# Patient Record
Sex: FEMALE | Race: WHITE | NOT HISPANIC OR LATINO | Employment: UNEMPLOYED | URBAN - METROPOLITAN AREA
[De-identification: names, ages, dates, MRNs, and addresses within clinical notes are randomized per-mention and may not be internally consistent; named-entity substitution may affect disease eponyms.]

---

## 2017-04-18 ENCOUNTER — HOSPITAL ENCOUNTER (OUTPATIENT)
Dept: OTHER | Facility: HOSPITAL | Age: 57
Setting detail: SPECIMEN
Discharge: HOME | End: 2017-04-18
Attending: NURSE PRACTITIONER | Admitting: NURSE PRACTITIONER

## 2017-04-18 LAB
ALBUMIN SERPL-MCNC: 3.4 G/DL (ref 3.5–4.8)
ALBUMIN/GLOB SERPL: 1.1 {RATIO} (ref 1–1.7)
ALP SERPL-CCNC: 57 IU/L (ref 32–91)
ALT SERPL-CCNC: 13 IU/L (ref 14–54)
ANION GAP SERPL CALC-SCNC: 11.8 MMOL/L (ref 10–20)
AST SERPL-CCNC: 18 IU/L (ref 15–41)
BASOPHILS # BLD AUTO: 0.1 10*3/UL (ref 0–0.2)
BASOPHILS NFR BLD AUTO: 1 % (ref 0–2)
BILIRUB SERPL-MCNC: 0.3 MG/DL (ref 0.3–1.2)
BUN SERPL-MCNC: 13 MG/DL (ref 8–20)
BUN/CREAT SERPL: 21.7 (ref 5.4–26.2)
CALCIUM SERPL-MCNC: 9.1 MG/DL (ref 8.9–10.3)
CHLORIDE SERPL-SCNC: 103 MMOL/L (ref 101–111)
CHOLEST SERPL-MCNC: 205 MG/DL
CHOLEST/HDLC SERPL: 4.2 {RATIO}
CONV CO2: 28 MMOL/L (ref 22–32)
CONV LDL CHOLESTEROL DIRECT: 139 MG/DL (ref 0–100)
CONV TOTAL PROTEIN: 6.6 G/DL (ref 6.1–7.9)
CREAT UR-MCNC: 0.6 MG/DL (ref 0.4–1)
DIFFERENTIAL METHOD BLD: (no result)
EOSINOPHIL # BLD AUTO: 0.1 10*3/UL (ref 0–0.3)
EOSINOPHIL # BLD AUTO: 2 % (ref 0–3)
ERYTHROCYTE [DISTWIDTH] IN BLOOD BY AUTOMATED COUNT: 13 % (ref 11.5–14.5)
GLOBULIN UR ELPH-MCNC: 3.2 G/DL (ref 2.5–3.8)
GLUCOSE SERPL-MCNC: 88 MG/DL (ref 65–99)
HCT VFR BLD AUTO: 37.9 % (ref 35–49)
HDLC SERPL-MCNC: 49 MG/DL
HGB BLD-MCNC: 12.9 G/DL (ref 12–15)
LDLC/HDLC SERPL: 2.8 {RATIO}
LIPID INTERPRETATION: ABNORMAL
LYMPHOCYTES # BLD AUTO: 2.1 10*3/UL (ref 0.8–4.8)
LYMPHOCYTES NFR BLD AUTO: 32 % (ref 18–42)
MCH RBC QN AUTO: 30.4 PG (ref 26–32)
MCHC RBC AUTO-ENTMCNC: 34 G/DL (ref 32–36)
MCV RBC AUTO: 89.3 FL (ref 80–94)
MONOCYTES # BLD AUTO: 0.4 10*3/UL (ref 0.1–1.3)
MONOCYTES NFR BLD AUTO: 6 % (ref 2–11)
NEUTROPHILS # BLD AUTO: 3.9 10*3/UL (ref 2.3–8.6)
NEUTROPHILS NFR BLD AUTO: 59 % (ref 50–75)
NRBC BLD AUTO-RTO: 0 /100{WBCS}
NRBC/RBC NFR BLD MANUAL: 0 10*3/UL
PLATELET # BLD AUTO: 303 10*3/UL (ref 150–450)
PMV BLD AUTO: 8.5 FL (ref 7.4–10.4)
POTASSIUM SERPL-SCNC: 3.8 MMOL/L (ref 3.6–5.1)
RBC # BLD AUTO: 4.24 10*6/UL (ref 4–5.4)
SODIUM SERPL-SCNC: 139 MMOL/L (ref 136–144)
TRIGL SERPL-MCNC: 136 MG/DL
VLDLC SERPL CALC-MCNC: 17.7 MG/DL
WBC # BLD AUTO: 6.6 10*3/UL (ref 4.5–11.5)

## 2019-07-29 RX ORDER — BUTALBITAL, ACETAMINOPHEN AND CAFFEINE 50; 325; 40 MG/1; MG/1; MG/1
TABLET ORAL
Qty: 15 TABLET | Refills: 1 | OUTPATIENT
Start: 2019-07-29 | End: 2019-07-30 | Stop reason: SDUPTHER

## 2019-07-31 RX ORDER — BUTALBITAL, ACETAMINOPHEN AND CAFFEINE 50; 325; 40 MG/1; MG/1; MG/1
TABLET ORAL
Qty: 15 TABLET | Refills: 2 | Status: SHIPPED | OUTPATIENT
Start: 2019-07-31 | End: 2019-12-04

## 2019-08-24 ENCOUNTER — HOSPITAL ENCOUNTER (EMERGENCY)
Facility: HOSPITAL | Age: 59
Discharge: HOME | End: 2019-08-24
Attending: EMERGENCY MEDICINE | Admitting: EMERGENCY MEDICINE

## 2019-08-24 ENCOUNTER — APPOINTMENT (OUTPATIENT)
Dept: GENERAL RADIOLOGY | Facility: HOSPITAL | Age: 59
End: 2019-08-24

## 2019-08-24 VITALS
TEMPERATURE: 98.5 F | SYSTOLIC BLOOD PRESSURE: 120 MMHG | BODY MASS INDEX: 23.71 KG/M2 | HEART RATE: 86 BPM | RESPIRATION RATE: 16 BRPM | DIASTOLIC BLOOD PRESSURE: 73 MMHG | WEIGHT: 138.89 LBS | OXYGEN SATURATION: 93 % | HEIGHT: 64 IN

## 2019-08-24 DIAGNOSIS — M25.511 ACUTE PAIN OF RIGHT SHOULDER: Primary | ICD-10-CM

## 2019-08-24 PROCEDURE — 63710000001 PREDNISONE PER 5 MG: Performed by: NURSE PRACTITIONER

## 2019-08-24 PROCEDURE — 73030 X-RAY EXAM OF SHOULDER: CPT

## 2019-08-24 PROCEDURE — 99283 EMERGENCY DEPT VISIT LOW MDM: CPT

## 2019-08-24 RX ORDER — HYDROCODONE BITARTRATE AND ACETAMINOPHEN 5; 325 MG/1; MG/1
1 TABLET ORAL EVERY 4 HOURS PRN
Qty: 15 TABLET | Refills: 0 | Status: SHIPPED | OUTPATIENT
Start: 2019-08-24 | End: 2019-12-04

## 2019-08-24 RX ORDER — PREDNISONE 20 MG/1
20 TABLET ORAL 2 TIMES DAILY
Qty: 10 TABLET | Refills: 0 | Status: SHIPPED | OUTPATIENT
Start: 2019-08-24 | End: 2019-12-04

## 2019-08-24 RX ORDER — HYDROCODONE BITARTRATE AND ACETAMINOPHEN 7.5; 325 MG/1; MG/1
1 TABLET ORAL ONCE
Status: COMPLETED | OUTPATIENT
Start: 2019-08-24 | End: 2019-08-24

## 2019-08-24 RX ADMIN — HYDROCODONE BITARTRATE AND ACETAMINOPHEN 1 TABLET: 7.5; 325 TABLET ORAL at 02:28

## 2019-08-24 RX ADMIN — PREDNISONE 60 MG: 10 TABLET ORAL at 02:28

## 2019-12-04 ENCOUNTER — APPOINTMENT (OUTPATIENT)
Dept: GENERAL RADIOLOGY | Facility: HOSPITAL | Age: 59
End: 2019-12-04

## 2019-12-04 ENCOUNTER — HOSPITAL ENCOUNTER (INPATIENT)
Facility: HOSPITAL | Age: 59
LOS: 4 days | Discharge: NURSING, REHAB OR LONG-TERM CARE | End: 2019-12-09
Attending: EMERGENCY MEDICINE | Admitting: INTERNAL MEDICINE

## 2019-12-04 DIAGNOSIS — J44.1 ACUTE EXACERBATION OF CHRONIC OBSTRUCTIVE PULMONARY DISEASE (COPD) (HCC): ICD-10-CM

## 2019-12-04 DIAGNOSIS — F11.11 HISTORY OF HEROIN ABUSE (HCC): ICD-10-CM

## 2019-12-04 DIAGNOSIS — J18.9 COMMUNITY ACQUIRED PNEUMONIA, BILATERAL: ICD-10-CM

## 2019-12-04 DIAGNOSIS — Z87.09 HISTORY OF PNEUMOTHORAX: ICD-10-CM

## 2019-12-04 DIAGNOSIS — A41.9 SEVERE SEPSIS (HCC): ICD-10-CM

## 2019-12-04 DIAGNOSIS — R65.20 SEVERE SEPSIS (HCC): ICD-10-CM

## 2019-12-04 DIAGNOSIS — F11.20 HEROIN ADDICTION (HCC): Chronic | ICD-10-CM

## 2019-12-04 DIAGNOSIS — J18.9 PNEUMONIA OF RIGHT MIDDLE LOBE DUE TO INFECTIOUS ORGANISM: Primary | ICD-10-CM

## 2019-12-04 PROBLEM — I21.3 ST ELEVATION (STEMI) MYOCARDIAL INFARCTION (HCC): Status: ACTIVE | Noted: 2019-12-04

## 2019-12-04 PROBLEM — G43.909 HEADACHE, MIGRAINE: Status: ACTIVE | Noted: 2019-12-04

## 2019-12-04 PROBLEM — I21.3 ST ELEVATION (STEMI) MYOCARDIAL INFARCTION (HCC): Status: RESOLVED | Noted: 2019-12-04 | Resolved: 2019-12-04

## 2019-12-04 LAB
ALBUMIN SERPL-MCNC: 3.8 G/DL (ref 3.5–5.2)
ALBUMIN/GLOB SERPL: 1 G/DL
ALP SERPL-CCNC: 92 U/L (ref 39–117)
ALT SERPL W P-5'-P-CCNC: 18 U/L (ref 1–33)
AMPHET+METHAMPHET UR QL: NEGATIVE
ANION GAP SERPL CALCULATED.3IONS-SCNC: 10 MMOL/L (ref 5–15)
AST SERPL-CCNC: 19 U/L (ref 1–32)
BARBITURATES UR QL SCN: NEGATIVE
BASOPHILS # BLD AUTO: 0.1 10*3/MM3 (ref 0–0.2)
BASOPHILS NFR BLD AUTO: 0.4 % (ref 0–1.5)
BENZODIAZ UR QL SCN: NEGATIVE
BILIRUB SERPL-MCNC: 0.2 MG/DL (ref 0.2–1.2)
BUN BLD-MCNC: 15 MG/DL (ref 6–20)
BUN/CREAT SERPL: 18.8 (ref 7–25)
CALCIUM SPEC-SCNC: 9 MG/DL (ref 8.6–10.5)
CANNABINOIDS SERPL QL: NEGATIVE
CHLORIDE SERPL-SCNC: 93 MMOL/L (ref 98–107)
CO2 SERPL-SCNC: 31 MMOL/L (ref 22–29)
COCAINE UR QL: NEGATIVE
CREAT BLD-MCNC: 0.8 MG/DL (ref 0.57–1)
D-LACTATE SERPL-SCNC: 0.8 MMOL/L (ref 0.5–2)
DEPRECATED RDW RBC AUTO: 44.6 FL (ref 37–54)
EOSINOPHIL # BLD AUTO: 0.4 10*3/MM3 (ref 0–0.4)
EOSINOPHIL NFR BLD AUTO: 2.5 % (ref 0.3–6.2)
ERYTHROCYTE [DISTWIDTH] IN BLOOD BY AUTOMATED COUNT: 14.3 % (ref 12.3–15.4)
GFR SERPL CREATININE-BSD FRML MDRD: 74 ML/MIN/1.73
GLOBULIN UR ELPH-MCNC: 3.8 GM/DL
GLUCOSE BLD-MCNC: 111 MG/DL (ref 65–99)
HCT VFR BLD AUTO: 36 % (ref 34–46.6)
HGB BLD-MCNC: 11.9 G/DL (ref 12–15.9)
HOLD SPECIMEN: NORMAL
HOLD SPECIMEN: NORMAL
LYMPHOCYTES # BLD AUTO: 1.7 10*3/MM3 (ref 0.7–3.1)
LYMPHOCYTES NFR BLD AUTO: 10.4 % (ref 19.6–45.3)
MCH RBC QN AUTO: 29.3 PG (ref 26.6–33)
MCHC RBC AUTO-ENTMCNC: 33 G/DL (ref 31.5–35.7)
MCV RBC AUTO: 88.6 FL (ref 79–97)
METHADONE UR QL SCN: NEGATIVE
MONOCYTES # BLD AUTO: 1.4 10*3/MM3 (ref 0.1–0.9)
MONOCYTES NFR BLD AUTO: 8.5 % (ref 5–12)
NEUTROPHILS # BLD AUTO: 12.5 10*3/MM3 (ref 1.7–7)
NEUTROPHILS NFR BLD AUTO: 78.2 % (ref 42.7–76)
NRBC BLD AUTO-RTO: 0 /100 WBC (ref 0–0.2)
OPIATES UR QL: NEGATIVE
OXYCODONE UR QL SCN: NEGATIVE
PLATELET # BLD AUTO: 298 10*3/MM3 (ref 140–450)
PMV BLD AUTO: 8 FL (ref 6–12)
POTASSIUM BLD-SCNC: 4.5 MMOL/L (ref 3.5–5.2)
PROT SERPL-MCNC: 7.6 G/DL (ref 6–8.5)
RBC # BLD AUTO: 4.06 10*6/MM3 (ref 3.77–5.28)
SODIUM BLD-SCNC: 134 MMOL/L (ref 136–145)
TROPONIN T SERPL-MCNC: <0.01 NG/ML (ref 0–0.03)
WBC NRBC COR # BLD: 16 10*3/MM3 (ref 3.4–10.8)
WHOLE BLOOD HOLD SPECIMEN: NORMAL
WHOLE BLOOD HOLD SPECIMEN: NORMAL

## 2019-12-04 PROCEDURE — 80307 DRUG TEST PRSMV CHEM ANLYZR: CPT | Performed by: EMERGENCY MEDICINE

## 2019-12-04 PROCEDURE — G0378 HOSPITAL OBSERVATION PER HR: HCPCS

## 2019-12-04 PROCEDURE — 84484 ASSAY OF TROPONIN QUANT: CPT | Performed by: EMERGENCY MEDICINE

## 2019-12-04 PROCEDURE — 99223 1ST HOSP IP/OBS HIGH 75: CPT | Performed by: INTERNAL MEDICINE

## 2019-12-04 PROCEDURE — 25010000002 METHYLPREDNISOLONE PER 125 MG: Performed by: EMERGENCY MEDICINE

## 2019-12-04 PROCEDURE — 25010000003 AMPICILLIN-SULBACTAM PER 1.5 G: Performed by: EMERGENCY MEDICINE

## 2019-12-04 PROCEDURE — 71045 X-RAY EXAM CHEST 1 VIEW: CPT

## 2019-12-04 PROCEDURE — 25010000002 VANCOMYCIN 10 G RECONSTITUTED SOLUTION: Performed by: EMERGENCY MEDICINE

## 2019-12-04 PROCEDURE — 83605 ASSAY OF LACTIC ACID: CPT

## 2019-12-04 PROCEDURE — 94640 AIRWAY INHALATION TREATMENT: CPT

## 2019-12-04 PROCEDURE — 85025 COMPLETE CBC W/AUTO DIFF WBC: CPT | Performed by: EMERGENCY MEDICINE

## 2019-12-04 PROCEDURE — 99285 EMERGENCY DEPT VISIT HI MDM: CPT

## 2019-12-04 PROCEDURE — 80053 COMPREHEN METABOLIC PANEL: CPT | Performed by: EMERGENCY MEDICINE

## 2019-12-04 PROCEDURE — 93005 ELECTROCARDIOGRAM TRACING: CPT | Performed by: EMERGENCY MEDICINE

## 2019-12-04 PROCEDURE — 87040 BLOOD CULTURE FOR BACTERIA: CPT | Performed by: EMERGENCY MEDICINE

## 2019-12-04 RX ORDER — IPRATROPIUM BROMIDE AND ALBUTEROL SULFATE 2.5; .5 MG/3ML; MG/3ML
3 SOLUTION RESPIRATORY (INHALATION) ONCE
Status: COMPLETED | OUTPATIENT
Start: 2019-12-04 | End: 2019-12-04

## 2019-12-04 RX ORDER — SODIUM CHLORIDE 0.9 % (FLUSH) 0.9 %
10 SYRINGE (ML) INJECTION AS NEEDED
Status: DISCONTINUED | OUTPATIENT
Start: 2019-12-04 | End: 2019-12-09 | Stop reason: HOSPADM

## 2019-12-04 RX ORDER — IPRATROPIUM BROMIDE AND ALBUTEROL SULFATE 2.5; .5 MG/3ML; MG/3ML
3 SOLUTION RESPIRATORY (INHALATION) ONCE
Status: DISCONTINUED | OUTPATIENT
Start: 2019-12-04 | End: 2019-12-04

## 2019-12-04 RX ORDER — ACETAMINOPHEN 500 MG
1000 TABLET ORAL ONCE
Status: COMPLETED | OUTPATIENT
Start: 2019-12-04 | End: 2019-12-04

## 2019-12-04 RX ORDER — METHYLPREDNISOLONE SODIUM SUCCINATE 125 MG/2ML
125 INJECTION, POWDER, LYOPHILIZED, FOR SOLUTION INTRAMUSCULAR; INTRAVENOUS ONCE
Status: COMPLETED | OUTPATIENT
Start: 2019-12-04 | End: 2019-12-04

## 2019-12-04 RX ORDER — BUTALBITAL, ACETAMINOPHEN AND CAFFEINE 50; 325; 40 MG/1; MG/1; MG/1
1 TABLET ORAL EVERY 6 HOURS PRN
Status: ON HOLD | COMMUNITY
End: 2019-12-09 | Stop reason: SDUPTHER

## 2019-12-04 RX ORDER — ALBUTEROL SULFATE 90 UG/1
2 AEROSOL, METERED RESPIRATORY (INHALATION) EVERY 4 HOURS PRN
COMMUNITY

## 2019-12-04 RX ORDER — BUPRENORPHINE HYDROCHLORIDE AND NALOXONE HYDROCHLORIDE DIHYDRATE 8; 2 MG/1; MG/1
1 TABLET SUBLINGUAL 2 TIMES DAILY
COMMUNITY

## 2019-12-04 RX ADMIN — METHYLPREDNISOLONE SODIUM SUCCINATE 125 MG: 125 INJECTION, POWDER, FOR SOLUTION INTRAMUSCULAR; INTRAVENOUS at 21:57

## 2019-12-04 RX ADMIN — ACETAMINOPHEN 1000 MG: 500 TABLET, FILM COATED ORAL at 20:32

## 2019-12-04 RX ADMIN — IPRATROPIUM BROMIDE AND ALBUTEROL SULFATE 3 ML: .5; 3 SOLUTION RESPIRATORY (INHALATION) at 21:34

## 2019-12-04 RX ADMIN — AMPICILLIN SODIUM AND SULBACTAM SODIUM 3 G: 2; 1 INJECTION, POWDER, FOR SOLUTION INTRAMUSCULAR; INTRAVENOUS at 23:57

## 2019-12-04 RX ADMIN — VANCOMYCIN HYDROCHLORIDE 1250 MG: 10 INJECTION, POWDER, LYOPHILIZED, FOR SOLUTION INTRAVENOUS at 21:59

## 2019-12-05 PROBLEM — F11.20 HEROIN ADDICTION (HCC): Chronic | Status: ACTIVE | Noted: 2019-12-05

## 2019-12-05 PROBLEM — J96.01 ACUTE RESPIRATORY FAILURE WITH HYPOXIA (HCC): Status: ACTIVE | Noted: 2019-12-05

## 2019-12-05 PROBLEM — A41.9 SEVERE SEPSIS (HCC): Status: ACTIVE | Noted: 2019-12-04

## 2019-12-05 PROBLEM — J44.1 COPD EXACERBATION (HCC): Status: ACTIVE | Noted: 2019-12-05

## 2019-12-05 PROBLEM — R65.20 SEVERE SEPSIS (HCC): Status: ACTIVE | Noted: 2019-12-04

## 2019-12-05 LAB
ANION GAP SERPL CALCULATED.3IONS-SCNC: 8 MMOL/L (ref 5–15)
BASOPHILS # BLD AUTO: 0 10*3/MM3 (ref 0–0.2)
BASOPHILS NFR BLD AUTO: 0.1 % (ref 0–1.5)
BUN BLD-MCNC: 16 MG/DL (ref 6–20)
BUN/CREAT SERPL: 21.6 (ref 7–25)
CALCIUM SPEC-SCNC: 9.1 MG/DL (ref 8.6–10.5)
CHLORIDE SERPL-SCNC: 100 MMOL/L (ref 98–107)
CO2 SERPL-SCNC: 31 MMOL/L (ref 22–29)
CREAT BLD-MCNC: 0.74 MG/DL (ref 0.57–1)
DEPRECATED RDW RBC AUTO: 46.8 FL (ref 37–54)
EOSINOPHIL # BLD AUTO: 0 10*3/MM3 (ref 0–0.4)
EOSINOPHIL NFR BLD AUTO: 0 % (ref 0.3–6.2)
ERYTHROCYTE [DISTWIDTH] IN BLOOD BY AUTOMATED COUNT: 14.6 % (ref 12.3–15.4)
GFR SERPL CREATININE-BSD FRML MDRD: 81 ML/MIN/1.73
GLUCOSE BLD-MCNC: 201 MG/DL (ref 65–99)
HBA1C MFR BLD: 5.5 % (ref 3.5–5.6)
HCT VFR BLD AUTO: 35.5 % (ref 34–46.6)
HGB BLD-MCNC: 11.6 G/DL (ref 12–15.9)
HIV1+2 AB SER QL: NORMAL
L PNEUMO1 AG UR QL IA: NEGATIVE
LYMPHOCYTES # BLD AUTO: 0.6 10*3/MM3 (ref 0.7–3.1)
LYMPHOCYTES NFR BLD AUTO: 4.2 % (ref 19.6–45.3)
MCH RBC QN AUTO: 29.1 PG (ref 26.6–33)
MCHC RBC AUTO-ENTMCNC: 32.5 G/DL (ref 31.5–35.7)
MCV RBC AUTO: 89.4 FL (ref 79–97)
MONOCYTES # BLD AUTO: 0.4 10*3/MM3 (ref 0.1–0.9)
MONOCYTES NFR BLD AUTO: 2.9 % (ref 5–12)
NEUTROPHILS # BLD AUTO: 13.5 10*3/MM3 (ref 1.7–7)
NEUTROPHILS NFR BLD AUTO: 92.8 % (ref 42.7–76)
NRBC BLD AUTO-RTO: 0 /100 WBC (ref 0–0.2)
PLATELET # BLD AUTO: 295 10*3/MM3 (ref 140–450)
PMV BLD AUTO: 8.4 FL (ref 6–12)
POTASSIUM BLD-SCNC: 5.1 MMOL/L (ref 3.5–5.2)
RBC # BLD AUTO: 3.98 10*6/MM3 (ref 3.77–5.28)
S PNEUM AG SPEC QL LA: NEGATIVE
SODIUM BLD-SCNC: 139 MMOL/L (ref 136–145)
WBC NRBC COR # BLD: 14.6 10*3/MM3 (ref 3.4–10.8)

## 2019-12-05 PROCEDURE — 80048 BASIC METABOLIC PNL TOTAL CA: CPT | Performed by: NURSE PRACTITIONER

## 2019-12-05 PROCEDURE — 25010000003 AMPICILLIN-SULBACTAM PER 1.5 G: Performed by: EMERGENCY MEDICINE

## 2019-12-05 PROCEDURE — 87340 HEPATITIS B SURFACE AG IA: CPT | Performed by: INTERNAL MEDICINE

## 2019-12-05 PROCEDURE — G0432 EIA HIV-1/HIV-2 SCREEN: HCPCS | Performed by: INTERNAL MEDICINE

## 2019-12-05 PROCEDURE — 83036 HEMOGLOBIN GLYCOSYLATED A1C: CPT | Performed by: INTERNAL MEDICINE

## 2019-12-05 PROCEDURE — 94760 N-INVAS EAR/PLS OXIMETRY 1: CPT

## 2019-12-05 PROCEDURE — 25010000002 VANCOMYCIN 1 G RECONSTITUTED SOLUTION 1 EACH VIAL: Performed by: EMERGENCY MEDICINE

## 2019-12-05 PROCEDURE — 25010000002 METHYLPREDNISOLONE PER 40 MG: Performed by: NURSE PRACTITIONER

## 2019-12-05 PROCEDURE — 25010000002 METHYLPREDNISOLONE PER 40 MG: Performed by: INTERNAL MEDICINE

## 2019-12-05 PROCEDURE — 87899 AGENT NOS ASSAY W/OPTIC: CPT | Performed by: INTERNAL MEDICINE

## 2019-12-05 PROCEDURE — 99233 SBSQ HOSP IP/OBS HIGH 50: CPT | Performed by: INTERNAL MEDICINE

## 2019-12-05 PROCEDURE — 86709 HEPATITIS A IGM ANTIBODY: CPT | Performed by: INTERNAL MEDICINE

## 2019-12-05 PROCEDURE — 86705 HEP B CORE ANTIBODY IGM: CPT | Performed by: INTERNAL MEDICINE

## 2019-12-05 PROCEDURE — 85025 COMPLETE CBC W/AUTO DIFF WBC: CPT | Performed by: NURSE PRACTITIONER

## 2019-12-05 PROCEDURE — 94799 UNLISTED PULMONARY SVC/PX: CPT

## 2019-12-05 RX ORDER — BISACODYL 5 MG/1
5 TABLET, DELAYED RELEASE ORAL DAILY PRN
Status: DISCONTINUED | OUTPATIENT
Start: 2019-12-05 | End: 2019-12-07

## 2019-12-05 RX ORDER — SODIUM CHLORIDE 0.9 % (FLUSH) 0.9 %
10 SYRINGE (ML) INJECTION AS NEEDED
Status: DISCONTINUED | OUTPATIENT
Start: 2019-12-05 | End: 2019-12-09 | Stop reason: HOSPADM

## 2019-12-05 RX ORDER — AZITHROMYCIN 250 MG/1
250 TABLET, FILM COATED ORAL DAILY
Status: DISCONTINUED | OUTPATIENT
Start: 2019-12-06 | End: 2019-12-05

## 2019-12-05 RX ORDER — IPRATROPIUM BROMIDE AND ALBUTEROL SULFATE 2.5; .5 MG/3ML; MG/3ML
3 SOLUTION RESPIRATORY (INHALATION) EVERY 4 HOURS PRN
Status: DISCONTINUED | OUTPATIENT
Start: 2019-12-05 | End: 2019-12-05

## 2019-12-05 RX ORDER — SODIUM CHLORIDE 9 MG/ML
100 INJECTION, SOLUTION INTRAVENOUS CONTINUOUS
Status: DISCONTINUED | OUTPATIENT
Start: 2019-12-05 | End: 2019-12-05

## 2019-12-05 RX ORDER — ACETAMINOPHEN 650 MG/1
650 SUPPOSITORY RECTAL EVERY 4 HOURS PRN
Status: DISCONTINUED | OUTPATIENT
Start: 2019-12-05 | End: 2019-12-09 | Stop reason: HOSPADM

## 2019-12-05 RX ORDER — ONDANSETRON 2 MG/ML
4 INJECTION INTRAMUSCULAR; INTRAVENOUS EVERY 6 HOURS PRN
Status: DISCONTINUED | OUTPATIENT
Start: 2019-12-05 | End: 2019-12-09 | Stop reason: HOSPADM

## 2019-12-05 RX ORDER — BUPRENORPHINE HYDROCHLORIDE AND NALOXONE HYDROCHLORIDE DIHYDRATE 8; 2 MG/1; MG/1
1 TABLET SUBLINGUAL 2 TIMES DAILY
Status: DISCONTINUED | OUTPATIENT
Start: 2019-12-05 | End: 2019-12-09 | Stop reason: HOSPADM

## 2019-12-05 RX ORDER — AZITHROMYCIN 250 MG/1
500 TABLET, FILM COATED ORAL DAILY
Status: COMPLETED | OUTPATIENT
Start: 2019-12-05 | End: 2019-12-05

## 2019-12-05 RX ORDER — SODIUM CHLORIDE 0.9 % (FLUSH) 0.9 %
10 SYRINGE (ML) INJECTION EVERY 12 HOURS SCHEDULED
Status: DISCONTINUED | OUTPATIENT
Start: 2019-12-05 | End: 2019-12-09 | Stop reason: HOSPADM

## 2019-12-05 RX ORDER — BUTALBITAL, ACETAMINOPHEN AND CAFFEINE 50; 325; 40 MG/1; MG/1; MG/1
1 TABLET ORAL EVERY 6 HOURS PRN
Status: DISCONTINUED | OUTPATIENT
Start: 2019-12-05 | End: 2019-12-07

## 2019-12-05 RX ORDER — IBUPROFEN 400 MG/1
400 TABLET ORAL EVERY 6 HOURS PRN
Status: DISCONTINUED | OUTPATIENT
Start: 2019-12-05 | End: 2019-12-09 | Stop reason: HOSPADM

## 2019-12-05 RX ORDER — IPRATROPIUM BROMIDE AND ALBUTEROL SULFATE 2.5; .5 MG/3ML; MG/3ML
3 SOLUTION RESPIRATORY (INHALATION)
Status: DISCONTINUED | OUTPATIENT
Start: 2019-12-05 | End: 2019-12-09 | Stop reason: HOSPADM

## 2019-12-05 RX ORDER — ALUMINA, MAGNESIA, AND SIMETHICONE 2400; 2400; 240 MG/30ML; MG/30ML; MG/30ML
15 SUSPENSION ORAL EVERY 6 HOURS PRN
Status: DISCONTINUED | OUTPATIENT
Start: 2019-12-05 | End: 2019-12-09 | Stop reason: HOSPADM

## 2019-12-05 RX ORDER — ACETAMINOPHEN 325 MG/1
650 TABLET ORAL EVERY 4 HOURS PRN
Status: DISCONTINUED | OUTPATIENT
Start: 2019-12-05 | End: 2019-12-09 | Stop reason: HOSPADM

## 2019-12-05 RX ORDER — METHYLPREDNISOLONE SODIUM SUCCINATE 40 MG/ML
40 INJECTION, POWDER, LYOPHILIZED, FOR SOLUTION INTRAMUSCULAR; INTRAVENOUS EVERY 12 HOURS
Status: DISCONTINUED | OUTPATIENT
Start: 2019-12-05 | End: 2019-12-06

## 2019-12-05 RX ORDER — ACETAMINOPHEN 160 MG/5ML
650 SOLUTION ORAL EVERY 4 HOURS PRN
Status: DISCONTINUED | OUTPATIENT
Start: 2019-12-05 | End: 2019-12-09 | Stop reason: HOSPADM

## 2019-12-05 RX ORDER — NICOTINE 21 MG/24HR
1 PATCH, TRANSDERMAL 24 HOURS TRANSDERMAL DAILY
Status: DISCONTINUED | OUTPATIENT
Start: 2019-12-05 | End: 2019-12-09 | Stop reason: HOSPADM

## 2019-12-05 RX ORDER — CHOLECALCIFEROL (VITAMIN D3) 125 MCG
5 CAPSULE ORAL NIGHTLY PRN
Status: DISCONTINUED | OUTPATIENT
Start: 2019-12-05 | End: 2019-12-09 | Stop reason: HOSPADM

## 2019-12-05 RX ORDER — BUPRENORPHINE HYDROCHLORIDE AND NALOXONE HYDROCHLORIDE DIHYDRATE 8; 2 MG/1; MG/1
1 TABLET SUBLINGUAL 2 TIMES DAILY
Status: DISCONTINUED | OUTPATIENT
Start: 2019-12-05 | End: 2019-12-05

## 2019-12-05 RX ORDER — METHYLPREDNISOLONE SODIUM SUCCINATE 40 MG/ML
40 INJECTION, POWDER, LYOPHILIZED, FOR SOLUTION INTRAMUSCULAR; INTRAVENOUS EVERY 8 HOURS
Status: DISCONTINUED | OUTPATIENT
Start: 2019-12-05 | End: 2019-12-05

## 2019-12-05 RX ORDER — ONDANSETRON 4 MG/1
4 TABLET, FILM COATED ORAL EVERY 6 HOURS PRN
Status: DISCONTINUED | OUTPATIENT
Start: 2019-12-05 | End: 2019-12-09 | Stop reason: HOSPADM

## 2019-12-05 RX ORDER — BUTALBITAL, ACETAMINOPHEN AND CAFFEINE 50; 325; 40 MG/1; MG/1; MG/1
1 TABLET ORAL EVERY 4 HOURS PRN
Status: DISCONTINUED | OUTPATIENT
Start: 2019-12-05 | End: 2019-12-05

## 2019-12-05 RX ADMIN — AMPICILLIN SODIUM AND SULBACTAM SODIUM 3 G: 2; 1 INJECTION, POWDER, FOR SOLUTION INTRAMUSCULAR; INTRAVENOUS at 04:28

## 2019-12-05 RX ADMIN — BUTALBITAL, ACETAMINOPHEN AND CAFFEINE 1 TABLET: 50; 325; 40 TABLET ORAL at 10:06

## 2019-12-05 RX ADMIN — SODIUM CHLORIDE 100 ML/HR: 900 INJECTION, SOLUTION INTRAVENOUS at 02:52

## 2019-12-05 RX ADMIN — IBUPROFEN 400 MG: 400 TABLET ORAL at 17:37

## 2019-12-05 RX ADMIN — Medication 10 ML: at 21:28

## 2019-12-05 RX ADMIN — IBUPROFEN 400 MG: 400 TABLET ORAL at 01:51

## 2019-12-05 RX ADMIN — METHYLPREDNISOLONE SODIUM SUCCINATE 40 MG: 40 INJECTION, POWDER, FOR SOLUTION INTRAMUSCULAR; INTRAVENOUS at 05:47

## 2019-12-05 RX ADMIN — BUTALBITAL, ACETAMINOPHEN AND CAFFEINE 1 TABLET: 50; 325; 40 TABLET ORAL at 12:15

## 2019-12-05 RX ADMIN — IPRATROPIUM BROMIDE AND ALBUTEROL SULFATE 3 ML: .5; 3 SOLUTION RESPIRATORY (INHALATION) at 11:40

## 2019-12-05 RX ADMIN — AZITHROMYCIN MONOHYDRATE 500 MG: 250 TABLET ORAL at 10:06

## 2019-12-05 RX ADMIN — BUPRENORPHINE AND NALOXONE 1 TABLET: 8; 2 TABLET SUBLINGUAL at 21:25

## 2019-12-05 RX ADMIN — VANCOMYCIN HYDROCHLORIDE 1000 MG: 1 INJECTION, POWDER, LYOPHILIZED, FOR SOLUTION INTRAVENOUS at 23:08

## 2019-12-05 RX ADMIN — NICOTINE 1 PATCH: 21 PATCH TRANSDERMAL at 09:46

## 2019-12-05 RX ADMIN — METHYLPREDNISOLONE SODIUM SUCCINATE 40 MG: 40 INJECTION, POWDER, FOR SOLUTION INTRAMUSCULAR; INTRAVENOUS at 17:37

## 2019-12-05 RX ADMIN — BUTALBITAL, ACETAMINOPHEN AND CAFFEINE 1 TABLET: 50; 325; 40 TABLET ORAL at 21:25

## 2019-12-05 RX ADMIN — SODIUM CHLORIDE 100 ML/HR: 0.9 INJECTION, SOLUTION INTRAVENOUS at 09:48

## 2019-12-05 RX ADMIN — AMPICILLIN SODIUM AND SULBACTAM SODIUM 3 G: 2; 1 INJECTION, POWDER, FOR SOLUTION INTRAMUSCULAR; INTRAVENOUS at 12:05

## 2019-12-05 RX ADMIN — Medication 10 ML: at 09:49

## 2019-12-05 RX ADMIN — BUTALBITAL, ACETAMINOPHEN AND CAFFEINE 1 TABLET: 50; 325; 40 TABLET ORAL at 17:37

## 2019-12-05 RX ADMIN — AMPICILLIN SODIUM AND SULBACTAM SODIUM 3 G: 2; 1 INJECTION, POWDER, FOR SOLUTION INTRAMUSCULAR; INTRAVENOUS at 21:24

## 2019-12-05 RX ADMIN — SODIUM CHLORIDE 500 ML: 900 INJECTION, SOLUTION INTRAVENOUS at 01:48

## 2019-12-05 RX ADMIN — VANCOMYCIN HYDROCHLORIDE 1000 MG: 1 INJECTION, POWDER, LYOPHILIZED, FOR SOLUTION INTRAVENOUS at 10:07

## 2019-12-05 RX ADMIN — BUPRENORPHINE AND NALOXONE 1 TABLET: 8; 2 TABLET SUBLINGUAL at 01:48

## 2019-12-05 RX ADMIN — SODIUM CHLORIDE 100 ML/HR: 900 INJECTION, SOLUTION INTRAVENOUS at 05:47

## 2019-12-05 RX ADMIN — AMPICILLIN SODIUM AND SULBACTAM SODIUM 3 G: 2; 1 INJECTION, POWDER, FOR SOLUTION INTRAMUSCULAR; INTRAVENOUS at 16:00

## 2019-12-05 NOTE — ASSESSMENT & PLAN NOTE
Duoneb, flutter valve, abx, iv steroid - will taper steroid off  Continue O2  Counseled about tobacco cessation

## 2019-12-05 NOTE — PROGRESS NOTES
"Pharmacy Dosing Service  Antibiotic  Vancomycin    59 y.o.female admitted with PNA. Pharmacy to dose vancomycin.    Assessment/Plan  1. Day #1 vancomycin: 1.25 gm (~20 mg/kg ABW) x 1 dose, then 1 gm (~15 mg/kg) IV q12hr. Will obtain pharmacokinetic levels prior to 4th dose with goal trough 15-20 mcg/mL.    2. Day #2 Ampicillin/sulbactam: 3 gm IV q6h      3. Will continue to monitor renal function, cultures and sensitivities, and patient clinical status.      Relevant clinical data and objective history reviewed:  162.6 cm (64\")   61.2 kg (135 lb)   Ideal body weight: 54.7 kg (120 lb 9.5 oz)  Adjusted ideal body weight: 57.3 kg (126 lb 5.7 oz)  Body mass index is 23.17 kg/m².    Creatinine   Date Value Ref Range Status   12/04/2019 0.80 0.57 - 1.00 mg/dL Final   04/18/2017 0.6 0.4 - 1.0 mg/dl Final     Estimated Creatinine Clearance: 74.1 mL/min (by C-G formula based on SCr of 0.8 mg/dL).  No intake/output data recorded.          WBC   Date Value Ref Range Status   12/04/2019 16.00 (H) 3.40 - 10.80 10*3/mm3 Final   04/18/2017 6.6 4.5 - 11.5 10*3/uL Final     Temperature    12/04/19 2006   Temp: (!) 103 °F (39.4 °C)     Baseline culture/source/susceptibility:  Microbiology Results (last 10 days)       ** No results found for the last 240 hours. **             Anti-Infectives (From admission, onward)      Ordered     Dose/Rate Route Frequency Start Stop    12/04/19 2230  !Vancomycin Level Draw Needed     Ordering Provider:  Yovani Lafleur MD     Does not apply Once 12/06/19 0900      12/04/19 2230  vancomycin (VANCOCIN) 1,000 mg in sodium chloride 0.9 % 250 mL IVPB     Ordering Provider:  Yovani Lafleur MD    15 mg/kg × 61.2 kg  over 60 Minutes Intravenous Every 12 Hours 12/05/19 1000 12/10/19 0959    12/04/19 2129  ampicillin-sulbactam (UNASYN) 3 g in sodium chloride 0.9 % 100 mL IVPB-MBP     Ordering Provider:  Yovani Lafleur MD    3 g Intravenous Every 6 Hours 12/04/19 2200 12/16/19 2159 12/04/19 2140  " vancomycin 1250 mg/250 mL 0.9% NS IVPB ([facility])     Ordering Provider:  Yovani Lafleur MD    1,250 mg Intravenous Once 12/04/19 2142 12/04/19 2159 12/04/19 2129  Pharmacy to dose vancomycin     Ordering Provider:  Yovani Lafleur MD     Does not apply Continuous PRN 12/04/19 2128 12/09/19 2127             Alice Quintero PharmD  12/04/19 10:30 PM

## 2019-12-05 NOTE — ED NOTES
Pt is sitting up in bed eating breakfast, pt was walked to the restroom without complication. Pt is tolerating 4L of oxygen, states she does not usually have to be on 02     Paula Benavides RN  12/05/19 0882

## 2019-12-05 NOTE — ASSESSMENT & PLAN NOTE
- triggered sepsis due to RLL pneumonia, elevated temp 103.1, hypoxia requiring 4L per NC, WBC 16, lactate 0.8  -Blood cultures - NG  -off fluid  - resolved

## 2019-12-05 NOTE — ED NOTES
Patient placed on hospital bed.  Will be hold in ER.  No further needs at this time.     Annette Ramírez RN  12/05/19 8539

## 2019-12-05 NOTE — H&P
[facility] Medicine Services      Patient Name: Yesica Boudreaux  : 1960  MRN: [id number]  Primary Care Physician: Oralia Senior  Date of admission: 2019    Patient Care Team:  Oralia Senior as PCP - General (Nurse Practitioner)          Subjective   History Present Illness     Chief Complaint:   Chief Complaint   Patient presents with   • Fever       Ms. Boudreaux is a 59 y.o.  female with a history of HTN, heroin abuse, migraines and CAD.  She presented to [facility] Emergency room 19 with fever, cough and SOA. Pt states that she recently was hospitalized at [facility] with a pneumothorax secondary to CPR being performed. Pt states she had overdosed on heroin on  and family members performed CPR until EMS arrived. Pt states she is now at [facility] rehab. Pt states she started to not feel well on 12/3/19, with fever of 103.0, cough, chest pain, congestion and SOA. She Pt states the pain in her chest is worse with cough, 5 out of 10 and eases at rest. Pt states she has noticed some wheezing and increase in SOA. Pt denies Nausea, vomiting, night sweats, hemoptysis or swelling in legs.     In the ED pt was found to be hypoxic with O2 sats less than 90% and placed on oxygen at 2L per NC. Febrile  Temp of 103.0, WBC 16, lactate 0.8, blood cultures pending. CXR: Small rt apical pneumothorax without mediatinal shift, extensive density in the RLL post obstructive pneumonia or atelectasis. Smaller patchy density in the LLL is favored to represent Pneumonia. Pt received DuoNeb x 1, Solu medrol 125mg IV, Vancomycin and Unasyn for pneumonia.   Pt will be admitted for further evaluation and workup.    Pt triggered sepsis with Temp 103.0, hypoxia, WBC 16.0 and Pneumonia.          Review of Systems   Constitution: Positive for chills, fever, weakness and malaise/fatigue. Negative for night sweats.   HENT: Negative.    Eyes: Negative.    Cardiovascular: Positive for  chest pain and dyspnea on exertion. Negative for leg swelling.   Respiratory: Positive for cough, shortness of breath and wheezing. Negative for hemoptysis.    Endocrine: Negative.    Hematologic/Lymphatic: Negative.    Skin: Negative.    Musculoskeletal: Negative.    Gastrointestinal: Negative.  Negative for nausea and vomiting.   Genitourinary: Negative.    Psychiatric/Behavioral: Negative.    Allergic/Immunologic: Negative.    All other systems reviewed and are negative.          Personal History     Past Medical History:   Past Medical History:   Diagnosis Date   • Headache    • Hypertension    • Myocardial infarction (CMS/HCC)    • Substance abuse (CMS/HCC)        Surgical History:      Past Surgical History:   Procedure Laterality Date   • CHOLECYSTECTOMY     • HERNIA REPAIR             Family History: family history is not on file. Otherwise pertinent FHx was reviewed and unremarkable.     Social History:  reports that she has been smoking cigarettes.  She has been smoking about 0.50 packs per day. She has never used smokeless tobacco. She reports that she uses drugs. Drug: Heroin. She reports that she does not drink alcohol.      Medications:  Prior to Admission medications    Medication Sig Start Date End Date Taking? Authorizing Provider   albuterol sulfate  (90 Base) MCG/ACT inhaler Inhale 2 puffs Every 4 (Four) Hours As Needed for Wheezing.   Yes ProviderElise MD   buprenorphine-naloxone (SUBOXONE) 8-2 MG per SL tablet Place 1 tablet under the tongue 2 (Two) Times a Day.   Yes ProviderElise MD   butalbital-acetaminophen-caffeine (FIORICET, ESGIC) -40 MG per tablet Take 1 tablet by mouth Every 6 (Six) Hours As Needed for Headache or Migraine.   Yes Elise Mcmanus MD   butalbital-acetaminophen-caffeine (FIORICET, ESGIC) -40 MG per tablet TAKE ONE (1) TABLET BY MOUTH EVERY 4 HOURS AS NEEDED FOR HEADACHE max FOUR (4) TABLETS DAILY 7/31/19 12/4/19  Jose Beltran  MD Evonne   HYDROcodone-acetaminophen (NORCO) 5-325 MG per tablet Take 1 tablet by mouth Every 4 (Four) Hours As Needed for Moderate Pain  or Severe Pain . 8/24/19 12/4/19  Robe Butler APRN   predniSONE (DELTASONE) 20 MG tablet Take 1 tablet by mouth 2 (Two) Times a Day. 8/24/19 12/4/19  Robe Butler APRN       Allergies:  No Known Allergies    Objective   Objective     Vital Signs  Temp:  [98 °F (36.7 °C)-103 °F (39.4 °C)] 98 °F (36.7 °C)  Heart Rate:  [79-92] 83  Resp:  [16-18] 16  BP: ()/(44-82) 108/48  SpO2:  [93 %-97 %] 94 %  on  Flow (L/min):  [2-4] 4;   Device (Oxygen Therapy): nasal cannula  Body mass index is 23.17 kg/m².    Physical Exam   Constitutional: She is oriented to person, place, and time. She appears well-developed and well-nourished. No distress.   HENT:   Head: Normocephalic and atraumatic.   Eyes: Conjunctivae and EOM are normal.   Neck: Normal range of motion. Neck supple.   Cardiovascular: Normal rate, regular rhythm, normal heart sounds and intact distal pulses.   Pulmonary/Chest: Effort normal. No respiratory distress. She has wheezes.   Abdominal: Soft. Bowel sounds are normal.   Musculoskeletal: Normal range of motion.   Neurological: She is alert and oriented to person, place, and time.   Skin: Skin is warm and dry.       Results Review:  I have personally reviewed most recent cardiac tracings, lab results and radiology images and interpretations and agree with findings.    Results from last 7 days   Lab Units 12/04/19 2017   WBC 10*3/mm3 16.00*   HEMOGLOBIN g/dL 11.9*   HEMATOCRIT % 36.0   PLATELETS 10*3/mm3 298     Results from last 7 days   Lab Units 12/04/19 2022 12/04/19 2017   SODIUM mmol/L  --  134*   POTASSIUM mmol/L  --  4.5   CHLORIDE mmol/L  --  93*   CO2 mmol/L  --  31.0*   BUN mg/dL  --  15   CREATININE mg/dL  --  0.80   GLUCOSE mg/dL  --  111*   CALCIUM mg/dL  --  9.0   ALT (SGPT) U/L  --  18   AST (SGOT) U/L  --  19   TROPONIN T ng/mL  --  <0.010   LACTATE  mmol/L 0.8  --      Estimated Creatinine Clearance: 74.1 mL/min (by C-G formula based on SCr of 0.8 mg/dL).  Brief Urine Lab Results     None          Microbiology Results (last 10 days)     ** No results found for the last 240 hours. **          ECG/EMG Results (most recent)     Procedure Component Value Units Date/Time    ECG 12 Lead [[id number]] Collected:  12/04/19 2035     Updated:  12/04/19 2036    Narrative:       HEART RATE= 86  bpm  RR Interval= 712  ms  IN Interval= 115  ms  P Horizontal Axis= 5  deg  P Front Axis= 66  deg  QRSD Interval= 78  ms  QT Interval= 342  ms  QRS Axis= 52  deg  T Wave Axis= 62  deg  - OTHERWISE NORMAL ECG -  Sinus rhythm  Atrial premature complex  No previous ECG available for comparison  Electronically Signed By:   Date and Time of Study: 2019-12-04 20:35:01                    Xr Chest 1 View    Result Date: 12/4/2019   1. Small right apical pneumothorax without mediastinal shift. 2. Prominent nodular right infrahilar region, mass/adenopathy is not excluded. There is extensive density in the right lower lobe with subtle signs of volume loss. Postobstructive pneumonia and atelectasis is not excluded. Smaller patchy density in the left lower lung is favored to represent pneumonia. 3. No prior chest imaging studies are available at this institution for comparison. Consider chest CT with contrast for further evaluation.  Electronically Signed By-Zen Hopson DO. On:12/4/2019 10:17 PM This report was finalized on [id number] by  Zen Hopson DO..        Estimated Creatinine Clearance: 74.1 mL/min (by C-G formula based on SCr of 0.8 mg/dL).    Assessment/Plan   Assessment/Plan       Active Hospital Problems:  * Pneumonia of right middle lobe due to infectious organism (CMS/HCC)- (present on admission)    -CXR: RLL PNA  -Continue Unasyn and Vancomycin  -Oxygen as need to keep sats 90-95%  -Duonebs PRN for Wheezing, SOA        Severe sepsis (CMS/HCC)- (present on admission)    -Pt  triggered sepsis due to RLL pneumonia, elevated temp 103.1, hypoxia requiring 4L per NC, WBC 16, lactate 0.8  -Blood cultures pending  -Will give fluids, possible dehydration      Heroin addiction (CMS/HCC)- (present on admission)     -Pt with recent overdose, currently at [facility]  -On Suboxone     Tobacco use- (present on admission)    -Nicotine patch  -Encourage lifestyle modification     Headache, migraine- (present on admission)    -Pt has taken Fioricet in past, however due to her heroin use and current with [facility] will hold, pt requests ibuprofen prn for headaches                  VTE Prophylaxis - SCDs.    CODE STATUS:    Code Status and Medical Interventions:   Ordered at: 12/04/19 7929     Level Of Support Discussed With:    Patient     Code Status:    CPR     Medical Interventions (Level of Support Prior to Arrest):    Full       Admission Status:  I believe this patient meets observation criteria.      I discussed the patients findings and my recommendations with patient.        Electronically signed by TAHMINA Kovacs, 12/04/19, 11:39 PM.  [facility] Hospitalist Team

## 2019-12-05 NOTE — ED NOTES
Care transferred from WILBER Reed Katie K, WILBER  12/05/19 0113       oSla Holt, WILBER  12/05/19 0114

## 2019-12-05 NOTE — PROGRESS NOTES
[facility]   INPATIENT PROGRESS NOTE    Date of Admission: 12/4/2019  Length of Stay: 0  Primary Care Physician: Oralia Senior    Subjective   Subjective   CC: fever without chills, productive cough and SOB    HPI:  59 y.o.  female with a history of HTN, heroin abuse, migraines and CAD.  She presented to [facility] Emergency room 12/4/19 with fever, cough and SOA. Pt states that she recently was hospitalized at [facility] with a pneumothorax secondary to CPR being performed. Pt states she had overdosed on heroin on Thanksgiving and family members performed CPR until EMS arrived. Pt states she is now at [facility] rehab. Pt states she started to not feel well on 12/3/19, with fever of 103.0, cough, chest pain, congestion and SOA. She Pt states the pain in her chest is worse with cough, 5 out of 10 and eases at rest. Pt states she has noticed some wheezing and increase in SOA. Pt denies Nausea, vomiting, night sweats, hemoptysis or swelling in legs.      In the ED pt was found to be hypoxic with O2 sats less than 90% and placed on oxygen at 2L per NC. Febrile  Temp of 103.0, WBC 16, lactate 0.8, blood cultures pending. CXR: Small rt apical pneumothorax without mediatinal shift, extensive density in the RLL post obstructive pneumonia or atelectasis. Smaller patchy density in the LLL is favored to represent Pneumonia. Pt received DuoNeb x 1, Solu medrol 125mg IV, Vancomycin and Unasyn for pneumonia.   Pt will be admitted for further evaluation and workup.     Pt triggered sepsis with Temp 103.0, hypoxia, WBC 16.0 and Pneumonia.             Review Of Systems:     Constitution: Positive for   fever, weakness and malaise/fatigue. Negative for night sweats.   HENT: Negative.    Eyes: Negative.    Cardiovascular: Positive for R side pleuritic chest pain and dyspnea on exertion. Negative for leg swelling.   Respiratory: Positive for cough, shortness of breath and wheezing. Negative for hemoptysis.     Endocrine: Negative.    Hematologic/Lymphatic: Negative.    Skin: Negative.    Musculoskeletal: Negative.    Gastrointestinal: Negative.  Negative for nausea and vomiting.   Genitourinary: Negative.    Psychiatric/Behavioral: Negative.    Allergic/Immunologic: Negative.    All other systems reviewed and are negative.      Objective  fever resolved. Feeling better  Objective    Physical Exam:  Temp:  [97.4 °F (36.3 °C)-103 °F (39.4 °C)] 97.4 °F (36.3 °C)  Heart Rate:  [61-92] 77  Resp:  [12-18] 16  BP: ()/(43-82) 120/66    Constitutional: She is oriented to person, place, and time. She appears well-developed and well-nourished. No distress.   HENT:   Head: Normocephalic and atraumatic.   Eyes: Conjunctivae and EOM are normal.   Neck: Normal range of motion. Neck supple.   Cardiovascular: Normal rate, regular rhythm, normal heart sounds and intact distal pulses.   Pulmonary/Chest: Effort normal. No respiratory distress.   wheezes. Basal rales and diminished bs on both lungs  Abdominal: Soft. Bowel sounds are normal.   Musculoskeletal: Normal range of motion.   Neurological: She is alert and oriented to person, place, and time.   Skin: Skin is warm and dry.     Results Review:    I have personally reviewed most recent cardiac tracings, lab results, microbiology results and radiology images and interpretations and agree with findings, most notably:  .      Results from last 7 days   Lab Units 12/05/19  0451 12/04/19 2017   WBC 10*3/mm3 14.60* 16.00*   HEMOGLOBIN g/dL 11.6* 11.9*   HEMATOCRIT % 35.5 36.0   PLATELETS 10*3/mm3 295 298     Results from last 7 days   Lab Units 12/05/19  0451 12/04/19 2017   SODIUM mmol/L 139 134*   POTASSIUM mmol/L 5.1 4.5   CHLORIDE mmol/L 100 93*   CO2 mmol/L 31.0* 31.0*   BUN mg/dL 16 15   CREATININE mg/dL 0.74 0.80   GLUCOSE mg/dL 201* 111*   CALCIUM mg/dL 9.1 9.0   ALK PHOS U/L  --  92   ALT (SGPT) U/L  --  18   AST (SGOT) U/L  --  19     Hemoglobin A1C (%)   Date/Time Value    12/05/2019 0940 5.5     Microbiology Results Abnormal     None        Xr Chest 1 View    Result Date: 12/4/2019   1. Small right apical pneumothorax without mediastinal shift. 2. Prominent nodular right infrahilar region, mass/adenopathy is not excluded. There is extensive density in the right lower lobe with subtle signs of volume loss. Postobstructive pneumonia and atelectasis is not excluded. Smaller patchy density in the left lower lung is favored to represent pneumonia. 3. No prior chest imaging studies are available at this institution for comparison. Consider chest CT with contrast for further evaluation.  Electronically Signed By-Zen Hopson DO. On:12/4/2019 10:17 PM This report was finalized on [id number] by  Zen Hopson DO..           I have reviewed the medications.    Assessment/Plan   Assessment/Plan   Brief Hospital Course:      Active Hospital Problems:  * Acute respiratory failure with hypoxia (CMS/[facility])- (present on admission)    Due to pneumonia and COPD exa  Continue O2      COPD exacerbation (CMS/[facility])- (present on admission)    Duoneb, flutter valve, abx  Continue O2  Counseled about tobacco cessation      Community acquired pneumonia, bilateral- (present on admission)    -CXR: RLL PNA  -Continue Unasyn and Vancomycin  -Oxygen as need to keep sats 90-95%  -Duonebs PRN for Wheezing, SOA  - sputum, blood cx pending  - urine legionella and strep ag pending        Heroin addiction (CMS/[facility])- (present on admission)     -Pt with recent overdose, currently at [facility] - will go back there  -On Suboxone  - denies any further abuse or hep C or HIV in the past     Severe sepsis (CMS/[facility])- (present on admission)    - triggered sepsis due to RLL pneumonia, elevated temp 103.1, hypoxia requiring 4L per NC, WBC 16, lactate 0.8  -Blood cultures pending  -on fluid     Tobacco use- (present on admission)    -Nicotine patch  -Encourage lifestyle modification     Headache, migraine- (present on  admission)    -Pt has taken Fioricet in past, however due to her heroin use and current with [facility] will hold, pt requests ibuprofen prn for headaches              DVT prophylaxis:lovenox  Discharge Planning: I expect patient to be discharged to rehab in a few days.    Shelly Lee MD, 12/05/19 3:07 PM

## 2019-12-05 NOTE — ED PROVIDER NOTES
Subjective    59-year-old female complaining of fever and shaking chills today.  She states she is been short of breath for the last 3 days.  She reports recent hospitalization at [facility] for pneumothorax that was sustained when the patient overdosed on heroin Thanksgiving day.  Apparently the family did CPR.  The patient states that she has had audible wheezing.  She states she is had difficulty smoking because she has been so short of breath.  She reports no leg pain or swelling.  She reports no night sweats or recent hemoptysis.  She has had pleuritic chest pain since the CPR episode            Review of Systems   Constitutional: Positive for chills, fatigue and fever.   HENT: Negative for sore throat.    Respiratory: Positive for chest tightness, shortness of breath and wheezing.    Cardiovascular: Positive for chest pain. Negative for palpitations and leg swelling.   Hematological: Bruises/bleeds easily.         Past medical history: Patient apparently gets most of her health care at [facility].  The patient was sent to a extended drug rehab facility and was referred here from that facility.  The patient pneumothorax apparently did not require chest tube placement.  The patient has a history of PVCs in the past.  She has a history of being status post cholecystectomy.  Patient was found unresponsive on Thanksgiving Eve and responded to Narcan.  Psychiatry was also consulted during the patient's last hospitalization      No Known Allergies    No past surgical history on file.    No family history on file.    Social History     Socioeconomic History   • Marital status:      Spouse name: Not on file   • Number of children: Not on file   • Years of education: Not on file   • Highest education level: Not on file           Objective   Physical Exam  Alert Santy Coma Scale 15   HEENT: Pupils equal and reactive to light. Conjunctivae are not injected. normal tympanic membranes.  Oropharynx and nares are normal.   Neck: Supple. Midline trachea. No JVD. No goiter.   Chest: Rales are identified in the right lower lobe right middle lobe distribution.  The patient has extensive rhonchi and expiratory wheezes bilaterally and equal breath sounds bilaterally regular rate and rhythm without murmur or rub.   Abdomen: Positive bowel sounds nontender nondistended. No rebound or peritoneal signs. No CVA tenderness.   Extremities no clubbing cyanosis or edema motor sensory exam is normal the full range of motion is intact   skin: Warm and dry, no rashes or petechia.   Lymphatic: No regional lymphadenopathy. No calf pain, swelling or Marquis's sign    Procedures           ED Course  ED Course as of Dec 04 2151   Wed Dec 04, 2019   2127 Felt somewhat better with ER therapy.  Pneumonia should be considered healthcare facility acquired  [TH]      ED Course User Index  [TH] Yovani Lafleur MD                  [facility]  Number of Diagnoses or Management Options     Amount and/or Complexity of Data Reviewed  Clinical lab tests: reviewed   Tests in the radiology section of CPT®:  reviewed   Tests in the medicine section of CPT®:  reviewed   Obtain history from someone other than the patient: yes   Review and summarize past medical records: yes   Discuss the patient with other providers: yes   Independent visualization of images, tracings, or specimens: yes    Risk of Complications, Morbidity, and/or Mortality  Presenting problems: high   Diagnostic procedures: high   Management options: high   General comments: Patient felt better with ER therapy.  She was advised of the test results.  The patient will be admitted to the hospital for IV antibiotics and steroids as well as oxygen support and bronchodilators.  The patient's O2 saturation was in the middle 80s at triage.  The patient stated that she understood the diagnosis and the case was discussed with the hospitalist practitioner    Patient Progress  Patient  progress: improved      Final diagnoses:   Pneumonia of right middle lobe due to infectious organism (CMS/HCC)   Acute exacerbation of chronic obstructive pulmonary disease (COPD) (CMS/HCC)   History of pneumothorax   History of heroin abuse (CMS/[facility])             Yovani Lafleur MD  12/04/19 4043

## 2019-12-05 NOTE — ASSESSMENT & PLAN NOTE
-Pt with recent overdose, currently at [facility] - will go back there  -On Suboxone  - denies any further abuse or hep C or HIV in the past  - neg HIV, neg hep A and B. No hep C done - but reports neg in the past

## 2019-12-05 NOTE — PAYOR COMM NOTE
"Nanette Jefferson RN    /Utilization Review  [id number] [place] [address]    [phone] office  [phone] fax  [facility]    [facility] NPI:   Hospital Tax ID: [id number]     ICD 10: j44.9  -  J18.1     COPD      Criteria Review   Clinical Indications for Admission to Inpatient Care     Return to top of Chronic Obstructive Pulmonary Disease - ISC  •Admission is indicated for 1 or more of the following(1)(2)(3):  ?High-risk active acute comorbidity (eg, pneumonia, dysrhythmia, heart failure, pleural effusion, pneumothorax, myopathy, rib fracture) with new or worsened (eg, from baseline) signs or symptoms of COPD (eg, dyspnea, Tachypnea, cough, sputum production)          Yesica Boudreaux (59 y.o. Female)     Date of Birth Social Security Number Address Home Phone MRN    1960  [address] [facility] [facility] [phone] [id number]    Ep[employer]opalian Marital Status          Confucianist        Admission Date Admission Type Admitting Provider Attending Provider Department, Room/Bed    12/4/19 Emergency [place], DO Melissa Wheeler Mi La, MD [facility] EMERGENCY DEPARTMENT, 12/12    Discharge Date Discharge Disposition Discharge Destination                       Attending Provider:  Shelly Torres MD    Allergies:  No Known Allergies    Isolation:  None   Infection:  None   Code Status:  CPR    Ht:  162.6 cm (64\")   Wt:  61.4 kg (135 lb 5.8 oz)    Admission Cmt:  None   Principal Problem:  Pneumonia of right middle lobe due to infectious organism (CMS/HCC) [J18.1] More...                 Active Insurance as of 12/4/2019     Primary Coverage     Payor Plan Insurance Group Employer/Plan Group    [id number]     Payor Plan Address Payor Plan Phone Number Payor Plan Fax Number Effective Dates    MAIL STOP:   6/1/2019 - None Entered    [address] [address]       [facility] [id number]       Subscriber Name " "Subscriber Birth Date Member ID       TSERING BRIGHT 1960 [id number]                 Emergency Contacts      (Rel.) Home Phone Work Phone Mobile Phone    LAMONTE LYLES (Daughter) [phone] -- --            History & Physical     No notes of this type exist for this encounter.        Oxygen Therapy (last 2 days)     Date/Time   SpO2   Device (Oxygen Therapy)   Flow (L/min)   Oxygen Concentration (%)   ETCO2 (mmHg)    12/05/19 08:31:39   94   nasal cannula   4   --   --    12/05/19 04:27:51   97   nasal cannula   4   --   --    12/05/19 0001   94   --   --   --   --    12/04/19 2301   97   --   --   --   --    12/04/19 2134   96   nasal cannula   4   --   --    12/04/19 2116   94   --   --   --   --    12/04/19 20:09:33   --   nasal cannula   4   --   --    12/04/19 2006 93   nasal cannula   2   --   --            Hospital Medications (active)       Dose Frequency Start End    !Vancomycin Level Draw Needed  Once 12/6/2019     Sig - Route: 1 (One) Time. - Does not apply    acetaminophen (TYLENOL) 160 MG/5ML solution 650 mg 650 mg Every 4 Hours PRN 12/5/2019     Sig - Route: Take 20.3 mL by mouth Every 4 (Four) Hours As Needed for Mild Pain . - Oral    Linked Group 1:  \"Or\" Linked Group Details        acetaminophen (TYLENOL) suppository 650 mg 650 mg Every 4 Hours PRN 12/5/2019     Sig - Route: Insert 1 suppository into the rectum Every 4 (Four) Hours As Needed for Mild Pain . - Rectal    Linked Group 1:  \"Or\" Linked Group Details        acetaminophen (TYLENOL) tablet 1,000 mg 1,000 mg Once 12/4/2019 12/4/2019    Sig - Route: Take 2 tablets by mouth 1 (One) Time. - Oral    Cosign for Ordering: Accepted by Yovani Lafleur MD on 12/4/2019  9:52 PM    acetaminophen (TYLENOL) tablet 650 mg 650 mg Every 4 Hours PRN 12/5/2019     Sig - Route: Take 2 tablets by mouth Every 4 (Four) Hours As Needed for Mild Pain . - Oral    Linked Group 1:  \"Or\" Linked Group Details        aluminum-magnesium " "hydroxide-simethicone (MAALOX MAX) 400-400-40 MG/5ML suspension 15 mL 15 mL Every 6 Hours PRN 12/5/2019     Sig - Route: Take 15 mL by mouth Every 6 (Six) Hours As Needed for Heartburn. - Oral    ampicillin-sulbactam (UNASYN) 3 g in sodium chloride 0.9 % 100 mL IVPB-MBP 3 g Every 6 Hours 12/4/2019 12/16/2019    Sig - Route: Infuse 3 g into a venous catheter Every 6 (Six) Hours. - Intravenous    azithromycin (ZITHROMAX) tablet 250 mg 250 mg Daily 12/6/2019 12/10/2019    Sig - Route: Take 1 tablet by mouth Daily. - Oral    Linked Group 2:  \"Followed by\" Linked Group Details        azithromycin (ZITHROMAX) tablet 500 mg 500 mg Daily 12/5/2019 12/6/2019    Sig - Route: Take 2 tablets by mouth Daily. - Oral    Linked Group 2:  \"Followed by\" Linked Group Details        bisacodyl (DULCOLAX) EC tablet 5 mg 5 mg Daily PRN 12/5/2019     Sig - Route: Take 1 tablet by mouth Daily As Needed for Constipation. - Oral    buprenorphine-naloxone (SUBOXONE) 8-2 MG per SL tablet 1 tablet 1 tablet 2 Times Daily 12/5/2019     Sig - Route: Place 1 tablet under the tongue 2 (Two) Times a Day. - Sublingual    butalbital-acetaminophen-caffeine (FIORICET, ESGIC) -40 MG per tablet 1 tablet 1 tablet Every 4 Hours PRN 12/5/2019     Sig - Route: Take 1 tablet by mouth Every 4 (Four) Hours As Needed for Headache. - Oral    Cosign for Ordering: Required by Shelly Torres MD    ibuprofen (ADVIL,MOTRIN) tablet 400 mg 400 mg Every 6 Hours PRN 12/5/2019     Sig - Route: Take 1 tablet by mouth Every 6 (Six) Hours As Needed for Mild Pain . - Oral    ipratropium-albuterol (DUO-NEB) nebulizer solution 3 mL 3 mL Once 12/4/2019 12/4/2019    Sig - Route: Take 3 mL by nebulization 1 (One) Time. - Nebulization    ipratropium-albuterol (DUO-NEB) nebulizer solution 3 mL 3 mL 4 Times Daily - RT 12/5/2019     Sig - Route: Take 3 mL by nebulization 4 (Four) Times a Day. - Nebulization    magnesium hydroxide (MILK OF MAGNESIA) suspension 2400 mg/10mL 10 mL 10 mL " "Daily PRN 12/5/2019     Sig - Route: Take 10 mL by mouth Daily As Needed for Constipation. - Oral    melatonin tablet 5 mg 5 mg Nightly PRN 12/5/2019     Sig - Route: Take 1 tablet by mouth At Night As Needed for Sleep. - Oral    methylPREDNISolone sodium succinate (SOLU-Medrol) injection 125 mg 125 mg Once 12/4/2019 12/4/2019    Sig - Route: Infuse 2 mL into a venous catheter 1 (One) Time. - Intravenous    methylPREDNISolone sodium succinate (SOLU-Medrol) injection 40 mg 40 mg Every 12 Hours 12/5/2019     Sig - Route: Infuse 1 mL into a venous catheter Every 12 (Twelve) Hours. - Intravenous    nicotine (NICODERM CQ) 21 MG/24HR patch 1 patch 1 patch Daily 12/5/2019     Sig - Route: Place 1 patch on the skin as directed by provider Daily. - Transdermal    ondansetron (ZOFRAN) injection 4 mg 4 mg Every 6 Hours PRN 12/5/2019     Sig - Route: Infuse 2 mL into a venous catheter Every 6 (Six) Hours As Needed for Nausea or Vomiting. - Intravenous    Linked Group 3:  \"Or\" Linked Group Details        ondansetron (ZOFRAN) tablet 4 mg 4 mg Every 6 Hours PRN 12/5/2019     Sig - Route: Take 1 tablet by mouth Every 6 (Six) Hours As Needed for Nausea or Vomiting. - Oral    Linked Group 3:  \"Or\" Linked Group Details        Pharmacy to dose vancomycin  Continuous PRN 12/4/2019 12/9/2019    Sig - Route: Continuous As Needed (Healthcare facility acquired pneumonia). - Does not apply    sodium chloride 0.9 % bolus 500 mL 500 mL Once 12/5/2019 12/5/2019    Sig - Route: Infuse 500 mL into a venous catheter 1 (One) Time. - Intravenous    sodium chloride 0.9 % flush 10 mL 10 mL As Needed 12/4/2019     Sig - Route: Infuse 10 mL into a venous catheter As Needed for Line Care. - Intravenous    Cosign for Ordering: Accepted by Yovani Lafleur MD on 12/4/2019  9:52 PM    sodium chloride 0.9 % flush 10 mL 10 mL Every 12 Hours Scheduled 12/5/2019     Sig - Route: Infuse 10 mL into a venous catheter Every 12 (Twelve) Hours. - Intravenous    " sodium chloride 0.9 % flush 10 mL 10 mL As Needed 12/5/2019     Sig - Route: Infuse 10 mL into a venous catheter As Needed for Line Care. - Intravenous    sodium chloride 0.9 % infusion 100 mL/hr Continuous 12/5/2019 12/5/2019    Sig - Route: Infuse 100 mL/hr into a venous catheter Continuous. - Intravenous    Notes to Pharmacy: Total 1.5 liter    vancomycin (VANCOCIN) 1,000 mg in sodium chloride 0.9 % 250 mL IVPB 15 mg/kg × 61.2 kg Every 12 Hours 12/5/2019 12/10/2019    Sig - Route: Infuse 1,000 mg into a venous catheter Every 12 (Twelve) Hours. - Intravenous    vancomycin 1250 mg/250 mL 0.9% NS IVPB ([facility]) 1,250 mg Once 12/4/2019 12/4/2019    Sig - Route: Infuse 250 mL into a venous catheter 1 (One) Time. - Intravenous    ipratropium-albuterol (DUO-NEB) nebulizer solution 3 mL (Discontinued) 3 mL Once 12/4/2019 12/4/2019    Sig - Route: Take 3 mL by nebulization 1 (One) Time. - Nebulization    ipratropium-albuterol (DUO-NEB) nebulizer solution 3 mL (Discontinued) 3 mL Every 4 Hours PRN 12/5/2019 12/5/2019    Sig - Route: Take 3 mL by nebulization Every 4 (Four) Hours As Needed for Shortness of Air. - Nebulization    methylPREDNISolone sodium succinate (SOLU-Medrol) injection 40 mg (Discontinued) 40 mg Every 8 Hours 12/5/2019 12/5/2019    Sig - Route: Infuse 1 mL into a venous catheter Every 8 (Eight) Hours. - Intravenous    sodium chloride 0.9 % infusion (Discontinued) 100 mL/hr Continuous 12/5/2019 12/5/2019    Sig - Route: Infuse 100 mL/hr into a venous catheter Continuous. - Intravenous          Lab Results (last 48 hours)     Procedure Component Value Units Date/Time    Hepatitis Diagnostic Profile [[id number]] Collected:  12/05/19 0940    Specimen:  Blood Updated:  12/05/19 0954    HIV-1 / O / 2 Ag / Antibody 4th Generation [[id number]] Collected:  12/05/19 0940    Specimen:  Blood Updated:  12/05/19 0954    Hemoglobin A1c [[id number]] Collected:  12/05/19 0940    Specimen:  Blood Updated:  12/05/19 0954    Basic  Metabolic Panel [[id number]]  (Abnormal) Collected:  12/05/19 0451    Specimen:  Blood Updated:  12/05/19 0546     Glucose 201 mg/dL      BUN 16 mg/dL      Creatinine 0.74 mg/dL      Sodium 139 mmol/L      Potassium 5.1 mmol/L      Chloride 100 mmol/L      CO2 31.0 mmol/L      Calcium 9.1 mg/dL      eGFR Non African Amer 81 mL/min/1.73      BUN/Creatinine Ratio 21.6     Anion Gap 8.0 mmol/L     Narrative:       GFR Normal >60  Chronic Kidney Disease <60  Kidney Failure <15    CBC Auto Differential [[id number]]  (Abnormal) Collected:  12/05/19 0451    Specimen:  Blood Updated:  12/05/19 0524     WBC 14.60 10*3/mm3      RBC 3.98 10*6/mm3      Hemoglobin 11.6 g/dL      Hematocrit 35.5 %      MCV 89.4 fL      MCH 29.1 pg      MCHC 32.5 g/dL      RDW 14.6 %      RDW-SD 46.8 fl      MPV 8.4 fL      Platelets 295 10*3/mm3      Neutrophil % 92.8 %      Lymphocyte % 4.2 %      Monocyte % 2.9 %      Eosinophil % 0.0 %      Basophil % 0.1 %      Neutrophils, Absolute 13.50 10*3/mm3      Lymphocytes, Absolute 0.60 10*3/mm3      Monocytes, Absolute 0.40 10*3/mm3      Eosinophils, Absolute 0.00 10*3/mm3      Basophils, Absolute 0.00 10*3/mm3      nRBC 0.0 /100 WBC     Urine Drug Screen - Urine, Clean Catch [[id number]]  (Normal) Collected:  12/04/19 2133    Specimen:  Urine, Clean Catch Updated:  12/04/19 2312     Amphet/Methamphet, Screen Negative     Barbiturates Screen, Urine Negative     Benzodiazepine Screen, Urine Negative     Cocaine Screen, Urine Negative     Opiate Screen Negative     THC, Screen, Urine Negative     Methadone Screen, Urine Negative     Oxycodone Screen, Urine Negative    Narrative:       Negative Thresholds For Drugs Screened:     Amphetamines               500 ng/ml   Barbiturates               200 ng/ml   Benzodiazepines            100 ng/ml   Cocaine                    300 ng/ml   Methadone                  300 ng/ml   Opiates                    300 ng/ml   Oxycodone                  100 ng/ml   THC                         50 ng/ml    The Normal Value for all drugs tested is negative. This report includes final unconfirmed screening results to be used for medical treatment purposes only. Unconfirmed results must not be used for non-medical purposes such as employment or legal testing. Clinical consideration should be applied to any drug of abuse test, particulary when unconfirmed results are used.  All urine drugs of abuse requests without chain of custody are for medical screening purposes only.  False positives are possible.      [place] Draw [[id number]] Collected:  12/04/19 2017    Specimen:  Blood Updated:  12/04/19 2240    Narrative:       The following orders were created for panel order [place] Draw.  Procedure                               Abnormality         Status                     ---------                               -----------         ------                     Light Blue Top[[id number]]                                   Final result               Green Top (Gel)[[id number]]                                  Final result               Lavender Top[[id number]]                                     Final result               Gold Top - SST[[id number]]                                   Final result                 Please view results for these tests on the individual orders.    Light Blue Top [[id number]] Collected:  12/04/19 2017    Specimen:  Blood Updated:  12/04/19 2240     Extra Tube hold for add-on     Comment: Auto resulted       Green Top (Gel) [[id number]] Collected:  12/04/19 2017    Specimen:  Blood Updated:  12/04/19 2240     Extra Tube Hold for add-ons.     Comment: Auto resulted.       Lavender Top [[id number]] Collected:  12/04/19 2017    Specimen:  Blood Updated:  12/04/19 2240     Extra Tube hold for add-on     Comment: Auto resulted       Gold Top - SST [[id number]] Collected:  12/04/19 2017    Specimen:  Blood Updated:  12/04/19 2240     Extra Tube Hold for add-ons.     Comment: Auto resulted.        Blood Culture - Blood, Arm, Right [[id number]] Collected:  12/04/19 2133    Specimen:  Blood from Arm, Right Updated:  12/04/19 2137    Comprehensive Metabolic Panel [[id number]]  (Abnormal) Collected:  12/04/19 2017    Specimen:  Blood Updated:  12/04/19 2047     Glucose 111 mg/dL      BUN 15 mg/dL      Creatinine 0.80 mg/dL      Sodium 134 mmol/L      Potassium 4.5 mmol/L      Chloride 93 mmol/L      CO2 31.0 mmol/L      Calcium 9.0 mg/dL      Total Protein 7.6 g/dL      Albumin 3.80 g/dL      ALT (SGPT) 18 U/L      AST (SGOT) 19 U/L      Alkaline Phosphatase 92 U/L      Total Bilirubin 0.2 mg/dL      eGFR Non African Amer 74 mL/min/1.73      Globulin 3.8 gm/dL      A/G Ratio 1.0 g/dL      BUN/Creatinine Ratio 18.8     Anion Gap 10.0 mmol/L     Narrative:       GFR Normal >60  Chronic Kidney Disease <60  Kidney Failure <15    Troponin [[id number]]  (Normal) Collected:  12/04/19 2017    Specimen:  Blood Updated:  12/04/19 2047     Troponin T <0.010 ng/mL     Narrative:       Troponin T Reference Range:  <= 0.03 ng/mL-   Negative for AMI  >0.03 ng/mL-     Abnormal for myocardial necrosis.  Clinicians would have to utilize clinical acumen, EKG, Troponin and serial changes to determine if it is an Acute Myocardial Infarction or myocardial injury due to an underlying chronic condition.     POC Lactate [[id number]]  (Normal) Collected:  12/04/19 2022    Specimen:  Blood Updated:  12/04/19 2025     Lactate 0.8 mmol/L      Comment: Serial Number: [id number]:  [id number]       CBC & Differential [[id number]] Collected:  12/04/19 2017    Specimen:  Blood Updated:  12/04/19 2023    Narrative:       The following orders were created for panel order CBC & Differential.  Procedure                               Abnormality         Status                     ---------                               -----------         ------                     CBC Auto Differential[[id number]]        Abnormal            Final result                  Please view results for these tests on the individual orders.    CBC Auto Differential [[id number]]  (Abnormal) Collected:  12/04/19 2017    Specimen:  Blood Updated:  12/04/19 2023     WBC 16.00 10*3/mm3      RBC 4.06 10*6/mm3      Hemoglobin 11.9 g/dL      Hematocrit 36.0 %      MCV 88.6 fL      MCH 29.3 pg      MCHC 33.0 g/dL      RDW 14.3 %      RDW-SD 44.6 fl      MPV 8.0 fL      Platelets 298 10*3/mm3      Neutrophil % 78.2 %      Lymphocyte % 10.4 %      Monocyte % 8.5 %      Eosinophil % 2.5 %      Basophil % 0.4 %      Neutrophils, Absolute 12.50 10*3/mm3      Lymphocytes, Absolute 1.70 10*3/mm3      Monocytes, Absolute 1.40 10*3/mm3      Eosinophils, Absolute 0.40 10*3/mm3      Basophils, Absolute 0.10 10*3/mm3      nRBC 0.0 /100 WBC     Blood Culture - Blood, Arm, Left [[id number]] Collected:  12/04/19 2017    Specimen:  Blood from Arm, Left Updated:  12/04/19 2021          Imaging Results (Last 48 Hours)     Procedure Component Value Units Date/Time    XR Chest 1 View [[id number]] Collected:  12/04/19 2212     Updated:  12/04/19 2219    Narrative:       XR CHEST 1 VW-     Date of Exam: 12/4/2019 8:15 PM     Indication: RECENT PNTX  59-year-old female chest pain on the right,  cough and fever history of pneumothorax on 11/27/2019. Smoker     Comparison: None available.     Technique: 1 view(s) of the chest were obtained.     FINDINGS: There is a small right apical pneumothorax. No mediastinal  shift. It is approximately 12 mm from the pleural surface to the right  apex. There is patchy airspace opacity in the right lower lobe as well  as in the periphery of the left lower lung concerning for bilateral  pneumonia. Pulmonary vascularity is unremarkable.  The right infrahilar  region is prominent, mass/adenopathy cannot be excluded. Heart size is  normal. The trachea is midline. Mediastinal silhouette is unremarkable.       Impression:          1. Small right apical pneumothorax without mediastinal  shift.  2. Prominent nodular right infrahilar region, mass/adenopathy is not  excluded. There is extensive density in the right lower lobe with subtle  signs of volume loss. Postobstructive pneumonia and atelectasis is not  excluded. Smaller patchy density in the left lower lung is favored to  represent pneumonia.  3. No prior chest imaging studies are available at this institution for  comparison. Consider chest CT with contrast for further evaluation.     Electronically Signed By-Zen Hopson DO. On:12/4/2019 10:17 PM  This report was finalized on [id number] by  Zen Hopson DO..        ECG/EMG Results (last 48 hours)     Procedure Component Value Units Date/Time    ECG 12 Lead [[id number]] Collected:  12/04/19 2035     Updated:  12/05/19 0644    Narrative:       HEART RATE= 86  bpm  RR Interval= 712  ms  SC Interval= 115  ms  P Horizontal Axis= 5  deg  P Front Axis= 66  deg  QRSD Interval= 78  ms  QT Interval= 342  ms  QRS Axis= 52  deg  T Wave Axis= 62  deg  - OTHERWISE NORMAL ECG -  Sinus rhythm  Atrial premature complex  No previous ECG available for comparison  Electronically Signed By: Yovani Lafleur (Curly) 05-Dec-2019 06:44:33  Date and Time of Study: 2019-12-04 20:35:01          Physician Progress Notes (last 48 hours) (Notes from 12/03/19 0956 through 12/05/19 0956)     No notes of this type exist for this encounter.        Consult Notes (last 48 hours) (Notes from 12/03/19 0956 through 12/05/19 0956)     No notes of this type exist for this encounter.

## 2019-12-05 NOTE — PROGRESS NOTES
"Pharmacy Dosing Service  Antibiotic  Vancomycin    Yesica Boudreaux is a 59 y.o.female from an extended drug rehab facility w/ PNA. Pharmacy consult to dose vancomycin.    PMH: COPD, IVDU, smoker, recent hospitalization at [facility] for a pneumothorax 2/2 heroin overdose 11/28      Assessment/Plan  1. Day #2 vancomycin: Continue 1000 mg (~15 mg/kg ABW) IV q12h. Pharmacokinetic levels ordered prior to 4th dose. Goal -600 and goal trough 10-20 mcg/ml.    2. Day #2 ampicillin-sulbactam: 3 g IV q6h    Monitor renal fxn, C&S, pt clinical status, MD A&P.     Relevant clinical data and objective history reviewed:  162.6 cm (64\")   61.4 kg (135 lb 5.8 oz)   Ideal body weight: 54.7 kg (120 lb 9.5 oz)  Adjusted ideal body weight: 57.4 kg (126 lb 8 oz)  Body mass index is 23.23 kg/m².    Lab Results   Component Value Date    CREATININE 0.74 12/05/2019    CREATININE 0.80 12/04/2019    CREATININE 0.6 04/18/2017     Estimated Creatinine Clearance: 80.3 mL/min (by C-G formula based on SCr of 0.74 mg/dL).  I/O last 3 completed shifts:  In: 950 [IV Piggyback:950]  Out: -     Temperature    12/05/19 0004 12/05/19 0427 12/05/19 0831   Temp: 98 °F (36.7 °C) 97.5 °F (36.4 °C) 97.4 °F (36.3 °C)     Lab Results   Component Value Date    WBC 14.60 (H) 12/05/2019    WBC 16.00 (H) 12/04/2019    WBC 6.6 04/18/2017     No results found for: PROCALCITO          Culture/source/susceptibility:  Microbiology Results (last 10 days)       ** No results found for the last 240 hours. **           Ashley Oquendo, PharmD  12/05/19 11:16 AM   "

## 2019-12-05 NOTE — ASSESSMENT & PLAN NOTE
-CXR: RLL >LLL, bilateral PNA  -Continue Unasyn and Vancomycin - will stop vanco, sputum cx- kleb P. Scan amount. Add zithromax  -Oxygen as need to keep sats 90-95% - improved in O2 demand  -Duonebs PRN for Wheezing, SOA  - sputum cx- Kleb P, blood cx -NG  - urine legionella and strep ag - neg  - fever resolved  - leukocytosis resolved  - clinically improved, can ambulate without SOB  - will change to po abx tomorrow for discharge

## 2019-12-06 LAB
BASOPHILS # BLD AUTO: 0 10*3/MM3 (ref 0–0.2)
BASOPHILS NFR BLD AUTO: 0.2 % (ref 0–1.5)
CREAT BLD-MCNC: 0.71 MG/DL (ref 0.57–1)
DEPRECATED RDW RBC AUTO: 45.5 FL (ref 37–54)
EOSINOPHIL # BLD AUTO: 0 10*3/MM3 (ref 0–0.4)
EOSINOPHIL NFR BLD AUTO: 0 % (ref 0.3–6.2)
ERYTHROCYTE [DISTWIDTH] IN BLOOD BY AUTOMATED COUNT: 14.5 % (ref 12.3–15.4)
GFR SERPL CREATININE-BSD FRML MDRD: 85 ML/MIN/1.73
HAV IGM SERPL QL IA: NEGATIVE
HBV CORE IGM SERPL QL IA: NEGATIVE
HBV SURFACE AG SERPL QL IA: NEGATIVE
HCT VFR BLD AUTO: 31.7 % (ref 34–46.6)
HGB BLD-MCNC: 10.6 G/DL (ref 12–15.9)
LYMPHOCYTES # BLD AUTO: 1.4 10*3/MM3 (ref 0.7–3.1)
LYMPHOCYTES NFR BLD AUTO: 6 % (ref 19.6–45.3)
MCH RBC QN AUTO: 29.4 PG (ref 26.6–33)
MCHC RBC AUTO-ENTMCNC: 33.3 G/DL (ref 31.5–35.7)
MCV RBC AUTO: 88.2 FL (ref 79–97)
MONOCYTES # BLD AUTO: 1.4 10*3/MM3 (ref 0.1–0.9)
MONOCYTES NFR BLD AUTO: 6.1 % (ref 5–12)
NEUTROPHILS # BLD AUTO: 19.9 10*3/MM3 (ref 1.7–7)
NEUTROPHILS NFR BLD AUTO: 87.7 % (ref 42.7–76)
NRBC BLD AUTO-RTO: 0 /100 WBC (ref 0–0.2)
PLATELET # BLD AUTO: 310 10*3/MM3 (ref 140–450)
PMV BLD AUTO: 9.2 FL (ref 6–12)
RBC # BLD AUTO: 3.59 10*6/MM3 (ref 3.77–5.28)
VANCOMYCIN PEAK SERPL-MCNC: 20.8 MCG/ML (ref 20–40)
VANCOMYCIN TROUGH SERPL-MCNC: 14 MCG/ML (ref 5–20)
WBC NRBC COR # BLD: 22.7 10*3/MM3 (ref 3.4–10.8)

## 2019-12-06 PROCEDURE — 87070 CULTURE OTHR SPECIMN AEROBIC: CPT | Performed by: INTERNAL MEDICINE

## 2019-12-06 PROCEDURE — 85025 COMPLETE CBC W/AUTO DIFF WBC: CPT | Performed by: INTERNAL MEDICINE

## 2019-12-06 PROCEDURE — 99232 SBSQ HOSP IP/OBS MODERATE 35: CPT | Performed by: INTERNAL MEDICINE

## 2019-12-06 PROCEDURE — 82565 ASSAY OF CREATININE: CPT | Performed by: INTERNAL MEDICINE

## 2019-12-06 PROCEDURE — 87186 SC STD MICRODIL/AGAR DIL: CPT | Performed by: INTERNAL MEDICINE

## 2019-12-06 PROCEDURE — 25010000002 VANCOMYCIN 1 G RECONSTITUTED SOLUTION 1 EACH VIAL: Performed by: EMERGENCY MEDICINE

## 2019-12-06 PROCEDURE — 94799 UNLISTED PULMONARY SVC/PX: CPT

## 2019-12-06 PROCEDURE — 80202 ASSAY OF VANCOMYCIN: CPT | Performed by: EMERGENCY MEDICINE

## 2019-12-06 PROCEDURE — 25010000003 AMPICILLIN-SULBACTAM PER 1.5 G: Performed by: EMERGENCY MEDICINE

## 2019-12-06 PROCEDURE — 87205 SMEAR GRAM STAIN: CPT | Performed by: INTERNAL MEDICINE

## 2019-12-06 PROCEDURE — 80202 ASSAY OF VANCOMYCIN: CPT | Performed by: INTERNAL MEDICINE

## 2019-12-06 PROCEDURE — 25010000002 METHYLPREDNISOLONE PER 40 MG: Performed by: INTERNAL MEDICINE

## 2019-12-06 RX ORDER — METHYLPREDNISOLONE SODIUM SUCCINATE 40 MG/ML
40 INJECTION, POWDER, LYOPHILIZED, FOR SOLUTION INTRAMUSCULAR; INTRAVENOUS EVERY 24 HOURS
Status: DISCONTINUED | OUTPATIENT
Start: 2019-12-07 | End: 2019-12-07

## 2019-12-06 RX ADMIN — BUPRENORPHINE AND NALOXONE 1 TABLET: 8; 2 TABLET SUBLINGUAL at 08:53

## 2019-12-06 RX ADMIN — VANCOMYCIN HYDROCHLORIDE 1000 MG: 1 INJECTION, POWDER, LYOPHILIZED, FOR SOLUTION INTRAVENOUS at 10:43

## 2019-12-06 RX ADMIN — AMPICILLIN SODIUM AND SULBACTAM SODIUM 3 G: 2; 1 INJECTION, POWDER, FOR SOLUTION INTRAMUSCULAR; INTRAVENOUS at 05:13

## 2019-12-06 RX ADMIN — NICOTINE 1 PATCH: 21 PATCH TRANSDERMAL at 10:42

## 2019-12-06 RX ADMIN — BISACODYL 5 MG: 5 TABLET, COATED ORAL at 14:39

## 2019-12-06 RX ADMIN — AMPICILLIN SODIUM AND SULBACTAM SODIUM 3 G: 2; 1 INJECTION, POWDER, FOR SOLUTION INTRAMUSCULAR; INTRAVENOUS at 12:43

## 2019-12-06 RX ADMIN — METHYLPREDNISOLONE SODIUM SUCCINATE 40 MG: 40 INJECTION, POWDER, FOR SOLUTION INTRAMUSCULAR; INTRAVENOUS at 05:19

## 2019-12-06 RX ADMIN — IBUPROFEN 400 MG: 400 TABLET ORAL at 00:58

## 2019-12-06 RX ADMIN — VANCOMYCIN HYDROCHLORIDE 1000 MG: 1 INJECTION, POWDER, LYOPHILIZED, FOR SOLUTION INTRAVENOUS at 21:10

## 2019-12-06 RX ADMIN — BUTALBITAL, ACETAMINOPHEN AND CAFFEINE 1 TABLET: 50; 325; 40 TABLET ORAL at 22:25

## 2019-12-06 RX ADMIN — IPRATROPIUM BROMIDE AND ALBUTEROL SULFATE 3 ML: .5; 3 SOLUTION RESPIRATORY (INHALATION) at 12:17

## 2019-12-06 RX ADMIN — AMPICILLIN SODIUM AND SULBACTAM SODIUM 3 G: 2; 1 INJECTION, POWDER, FOR SOLUTION INTRAMUSCULAR; INTRAVENOUS at 22:21

## 2019-12-06 RX ADMIN — IPRATROPIUM BROMIDE AND ALBUTEROL SULFATE 3 ML: .5; 3 SOLUTION RESPIRATORY (INHALATION) at 07:46

## 2019-12-06 RX ADMIN — BUPRENORPHINE AND NALOXONE 1 TABLET: 8; 2 TABLET SUBLINGUAL at 21:10

## 2019-12-06 RX ADMIN — AMPICILLIN SODIUM AND SULBACTAM SODIUM 3 G: 2; 1 INJECTION, POWDER, FOR SOLUTION INTRAMUSCULAR; INTRAVENOUS at 16:33

## 2019-12-06 RX ADMIN — IPRATROPIUM BROMIDE AND ALBUTEROL SULFATE 3 ML: .5; 3 SOLUTION RESPIRATORY (INHALATION) at 15:44

## 2019-12-06 RX ADMIN — MAGNESIUM HYDROXIDE 10 ML: 2400 SUSPENSION ORAL at 12:42

## 2019-12-06 RX ADMIN — BUTALBITAL, ACETAMINOPHEN AND CAFFEINE 1 TABLET: 50; 325; 40 TABLET ORAL at 16:29

## 2019-12-06 RX ADMIN — ACETAMINOPHEN 650 MG: 325 TABLET ORAL at 21:23

## 2019-12-06 RX ADMIN — IBUPROFEN 400 MG: 400 TABLET ORAL at 22:25

## 2019-12-06 RX ADMIN — BUTALBITAL, ACETAMINOPHEN AND CAFFEINE 1 TABLET: 50; 325; 40 TABLET ORAL at 08:53

## 2019-12-06 RX ADMIN — IPRATROPIUM BROMIDE AND ALBUTEROL SULFATE 3 ML: .5; 3 SOLUTION RESPIRATORY (INHALATION) at 18:59

## 2019-12-06 RX ADMIN — Medication 10 ML: at 21:10

## 2019-12-06 RX ADMIN — Medication: at 03:05

## 2019-12-06 RX ADMIN — Medication 10 ML: at 10:43

## 2019-12-06 RX ADMIN — IBUPROFEN 400 MG: 400 TABLET ORAL at 16:29

## 2019-12-06 NOTE — PLAN OF CARE
Problem: Pain, Chronic (Adult)  Goal: Acceptable Pain/Comfort Level and Functional Ability   12/06/19 0452   Pain, Chronic (Adult)   Acceptable Pain/Comfort Level and Functional Ability making progress toward outcome

## 2019-12-06 NOTE — PROGRESS NOTES
Case Management/Social Work    Patient Name:  Yesica Boudreaux  YOB: 1960  MRN: [id number]  Admit Date:  12/4/2019        Sw met with pt at bedside and she is wanting to return to [facility] at [place].She reports she has already been in contact with the center and they are holding her bed.Pt states the center normally sends an Uber to assist pt in getting back.Rn was left Cab voucher if it is needed to get pt back to [place].        Electronically signed by:  Tianna Breaux  12/06/19 5:42 PM   [phone]

## 2019-12-06 NOTE — PLAN OF CARE
Problem: Pain, Chronic (Adult)  Goal: Acceptable Pain/Comfort Level and Functional Ability   12/06/19 0451   Pain, Chronic (Adult)   Acceptable Pain/Comfort Level and Functional Ability making progress toward outcome

## 2019-12-06 NOTE — PROGRESS NOTES
Psychiatric   INPATIENT PROGRESS NOTE    Date of Admission: 12/4/2019  Length of Stay: 1  Primary Care Physician: Oralia Senior    Subjective   Subjective   CC: fever without chills, productive cough and SOB    HPI:  59 y.o.  female with a history of HTN, heroin abuse, migraines and CAD.  She presented to [facility] Emergency room 12/4/19 with fever, cough and SOA. Pt states that she recently was hospitalized at [facility] with a pneumothorax secondary to CPR being performed. Pt states she had overdosed on heroin on Thanksgiving and family members performed CPR until EMS arrived. Pt states she is now at [facility] rehab. Pt states she started to not feel well on 12/3/19, with fever of 103.0, cough, chest pain, congestion and SOA. She Pt states the pain in her chest is worse with cough, 5 out of 10 and eases at rest. Pt states she has noticed some wheezing and increase in SOA. Pt denies Nausea, vomiting, night sweats, hemoptysis or swelling in legs.      In the ED pt was found to be hypoxic with O2 sats less than 90% and placed on oxygen at 2L per NC. Febrile  Temp of 103.0, WBC 16, lactate 0.8, blood cultures pending. CXR: Small rt apical pneumothorax without mediatinal shift, extensive density in the RLL post obstructive pneumonia or atelectasis. Smaller patchy density in the LLL is favored to represent Pneumonia. Pt received DuoNeb x 1, Solu medrol 125mg IV, Vancomycin and Unasyn for pneumonia.   Pt will be admitted for further evaluation and workup.     Pt triggered sepsis with Temp 103.0, hypoxia, WBC 16.0 and Pneumonia.             Review Of Systems:     Constitution: Positive for  weakness and malaise/fatigue. Negative for night sweats.   HENT: Negative.    Eyes: Negative.    Cardiovascular: Positive for R side pleuritic chest pain and dyspnea on exertion. Negative for leg swelling.   Respiratory: Positive for cough, shortness of breath and wheezing. Negative for hemoptysis.    Endocrine:  Negative.    Hematologic/Lymphatic: Negative.    Skin: Negative.    Musculoskeletal: Negative.    Gastrointestinal: Negative.  Negative for nausea and vomiting.   Genitourinary: Negative.    Psychiatric/Behavioral: Negative.    Allergic/Immunologic: Negative.    All other systems reviewed and are negative.      Objective     fever resolved.    Objective    Physical Exam:  Temp:  [97.6 °F (36.4 °C)-98.4 °F (36.9 °C)] 98 °F (36.7 °C)  Heart Rate:  [54-78] 56  Resp:  [14-16] 14  BP: (124-150)/(52-75) 150/75    Constitutional: She is oriented to person, place, and time. She appears well-developed and well-nourished. No distress.   HENT:   Head: Normocephalic and atraumatic.   Eyes: Conjunctivae and EOM are normal.   Neck: Normal range of motion. Neck supple.   Cardiovascular: Normal rate, regular rhythm, normal heart sounds and intact distal pulses.   Pulmonary/Chest: Effort normal. No respiratory distress.   wheezes. Basal rales and diminished bs on both lungs  Abdominal: Soft. Bowel sounds are normal.   Musculoskeletal: Normal range of motion.   Neurological: She is alert and oriented to person, place, and time.   Skin: Skin is warm and dry.     Results Review:    I have personally reviewed most recent cardiac tracings, lab results, microbiology results and radiology images and interpretations and agree with findings, most notably:  .      Results from last 7 days   Lab Units 12/06/19  0249 12/05/19 0451 12/04/19 2017   WBC 10*3/mm3 22.70* 14.60* 16.00*   HEMOGLOBIN g/dL 10.6* 11.6* 11.9*   HEMATOCRIT % 31.7* 35.5 36.0   PLATELETS 10*3/mm3 310 295 298     Results from last 7 days   Lab Units 12/06/19  0248 12/05/19 0451 12/04/19 2017   SODIUM mmol/L  --  139 134*   POTASSIUM mmol/L  --  5.1 4.5   CHLORIDE mmol/L  --  100 93*   CO2 mmol/L  --  31.0* 31.0*   BUN mg/dL  --  16 15   CREATININE mg/dL 0.71 0.74 0.80   GLUCOSE mg/dL  --  201* 111*   CALCIUM mg/dL  --  9.1 9.0   ALK PHOS U/L  --   --  92   ALT (SGPT) U/L   --   --  18   AST (SGOT) U/L  --   --  19     Hemoglobin A1C (%)   Date/Time Value   12/05/2019 0940 5.5     Microbiology Results Abnormal     Procedure Component Value - Date/Time    Respiratory Culture - Sputum, Cough [[id number]] Collected:  12/06/19 1045    Lab Status:  Preliminary result Specimen:  Sputum from Cough Updated:  12/06/19 1304     Gram Stain Moderate (3+) WBCs per low power field      Occasional Gram positive cocci    Blood Culture - Blood, Arm, Right [[id number]] Collected:  12/04/19 2133    Lab Status:  Preliminary result Specimen:  Blood from Arm, Right Updated:  12/05/19 2240     Blood Culture No growth at 24 hours    Blood Culture - Blood, Arm, Left [[id number]] Collected:  12/04/19 2017    Lab Status:  Preliminary result Specimen:  Blood from Arm, Left Updated:  12/05/19 2034     Blood Culture No growth at 24 hours    Legionella Antigen, Urine - Urine, Urine, Clean Catch [[id number]]  (Normal) Collected:  12/05/19 1645    Lab Status:  Final result Specimen:  Urine, Clean Catch Updated:  12/05/19 1833     LEGIONELLA ANTIGEN, URINE Negative    S. Pneumo Ag Urine or CSF - Urine, Urine, Clean Catch [[id number]]  (Normal) Collected:  12/05/19 1645    Lab Status:  Final result Specimen:  Urine, Clean Catch Updated:  12/05/19 1833     Strep Pneumo Ag Negative        Xr Chest 1 View    Result Date: 12/4/2019   1. Small right apical pneumothorax without mediastinal shift. 2. Prominent nodular right infrahilar region, mass/adenopathy is not excluded. There is extensive density in the right lower lobe with subtle signs of volume loss. Postobstructive pneumonia and atelectasis is not excluded. Smaller patchy density in the left lower lung is favored to represent pneumonia. 3. No prior chest imaging studies are available at this institution for comparison. Consider chest CT with contrast for further evaluation.  Electronically Signed By-Zen Hopson DO. On:12/4/2019 10:17 PM This report was finalized on  [id number] by  Zen Hopson DO..           I have reviewed the medications.    Assessment/Plan   Assessment/Plan   Brief Hospital Course:      Active Hospital Problems:  * Acute respiratory failure with hypoxia (CMS/[facility])- (present on admission)    Due to pneumonia and COPD exa  Continue O2 - improved in demand     COPD exacerbation (CMS/[facility])- (present on admission)    Duoneb, flutter valve, abx, iv steroid - will taper steroid off  Continue O2  Counseled about tobacco cessation      Severe sepsis (CMS/[facility])- (present on admission)    - triggered sepsis due to RLL pneumonia, elevated temp 103.1, hypoxia requiring 4L per NC, WBC 16, lactate 0.8  -Blood cultures pending  -on fluid     Community acquired pneumonia, bilateral- (present on admission)    -CXR: RLL PNA  -Continue Unasyn and Vancomycin  -Oxygen as need to keep sats 90-95% - improved in O2 demand  -Duonebs PRN for Wheezing, SOA  - sputum, blood cx pending  - urine legionella and strep ag - neg  - fever resolve  - leukocytosis up due to steroid,         Heroin addiction (CMS/[facility])- (present on admission)     -Pt with recent overdose, currently at [facility] - will go back there  -On Suboxone  - denies any further abuse or hep C or HIV in the past     Tobacco use- (present on admission)    -Nicotine patch  -Encourage lifestyle modification     Headache, migraine- (present on admission)    -Pt has taken Fioricet in past, however due to her heroin use and current with [facility] will hold, pt requests ibuprofen prn for headaches              DVT prophylaxis:lovenox  Discharge Planning: I expect patient to be discharged to rehab in a few days.    Shelly Lee MD, 12/05/19 3:07 PM

## 2019-12-06 NOTE — PLAN OF CARE
Problem: Sepsis/Septic Shock (Adult)  Goal: Signs and Symptoms of Listed Potential Problems Will be Absent, Minimized or Managed (Sepsis/Septic Shock)   12/06/19 9611   Goal/Outcome Evaluation   Problems Present (Sepsis) none

## 2019-12-06 NOTE — SIGNIFICANT NOTE
12/06/19 1340   Pastoral/Spiritual Care   Pastoral Care Visit Type initial   Pastoral Care Source patient clergy request;spiritual leader request  (helped other  with consults)   Receptivity to Spiritual Care visit welcomed   Pastoral Care Request coping/stress of illness support;mood/anxiety support;prayer support;spiritual/moral support   Pastoral Care Interventions decision-making facilitated;prayer support provided;scripture assistance provided;supportive conversation provided;theological discussion provided   Pastoral Care Response receptive of support;thanks expressed;visit helpful   Use of Spiritual Resources devotional reading;prayer;spiritual issues, indicated struggles   Pastoral Care Follow-Up follow-up, none required as presently assessed     Consult visit. Pt processed some of her life decisions with . Some have their foundation in tragedy and others were due to pt's own decisions. Pt talked about Gnosticism and her desire to not return to a particular one. We talked about God and the [employer]'s response to salvation. Pt wanted to get closer with the Lord and felt that she was not ready, spiritually, to die. They discussed God, Alex, Gnosticism, salvation and the need for forgiveness. Pt was very thankful for the visit, prayer, and talk and invited  to stop back at any time.  gave her a Bible and a cross and blessed her. Thankful. No other needs.

## 2019-12-06 NOTE — PLAN OF CARE
Problem: Patient Care Overview  Goal: Plan of Care Review   12/06/19 0453   Coping/Psychosocial   Plan of Care Reviewed With patient   Plan of Care Review   Progress no change   OTHER   Outcome Summary pt very anxious at the beginning of the shift but rested well through the night with little c/o pain

## 2019-12-06 NOTE — PLAN OF CARE
Problem: Pain, Chronic (Adult)  Goal: Identify Related Risk Factors and Signs and Symptoms   12/06/19 0452   Pain, Chronic (Adult)   Related Risk Factors (Chronic Pain) disease process;pain control inadequate   Signs and Symptoms (Chronic Pain) questions meaning of pain

## 2019-12-06 NOTE — PROGRESS NOTES
Discharge Planning Assessment   [place]     Patient Name: Yesica Boudreaux  MRN: [id number]  Today's Date: 12/6/2019    Admit Date: 12/4/2019    Discharge Needs Assessment     Row Name 12/06/19 1316       Living Environment    Lives With  other (see comments) Currently at [facility]    Current Living Arrangements  other (see comments) Recovery facility    Primary Care Provided by  self    Provides Primary Care For  no one    Able to Return to Prior Arrangements  yes       Resource/Environmental Concerns    Resource/Environmental Concerns  none    Transportation Concerns  car, none       Transition Planning    Patient/Family Anticipates Transition to  other (see comments) [facility]    Patient/Family Anticipated Services at Transition  none    Transportation Anticipated  other (see comments) Reports [facility] will arrange and pay for transportation       Discharge Needs Assessment    Readmission Within the Last 30 Days  no previous admission in last 30 days    Concerns to be Addressed  discharge planning    Equipment Currently Used at Home  none        Discharge Plan     Row Name 12/06/19 3542       Plan    Plan  Anticipate return to [facility].     Patient/Family in Agreement with Plan  yes    Plan Comments  Met with patient at bedside who reports no anticipated needs. Patient reports she is from [facility], plans to return, and that facility will provide and pay for transportation. Patient plans to discharhge to another facility after [place] to continue her recovery. Nena, MSW, notified of patient's plan to return to [facility] as well as history of Heroin use. Barrier: IV antibiotics.              Expected Discharge Date and Time     Expected Discharge Date Expected Discharge Time    Dec 8, 2019         Demographic Summary     Row Name 12/06/19 4377       General Information    Admission Type  inpatient    Arrived From  other (see comments) [facility]    Referral Source   admission list    Reason for Consult  discharge planning        Functional Status     Row Name 12/06/19 1316       Functional Status    Usual Activity Tolerance  good    Current Activity Tolerance  good       Functional Status, IADL    Medications  independent    Meal Preparation  independent    Housekeeping  independent    Laundry  independent    Shopping  independent       Mental Status    General Appearance WDL  WDL       Mental Status Summary    Recent Changes in Mental Status/Cognitive Functioning  no changes        Shreya Serrano  [phone]

## 2019-12-06 NOTE — PLAN OF CARE
Problem: Infection, Risk/Actual (Adult)  Goal: Infection Prevention/Resolution   12/06/19 0451   Infection, Risk/Actual (Adult)   Infection Prevention/Resolution making progress toward outcome

## 2019-12-06 NOTE — PLAN OF CARE
Problem: Infection, Risk/Actual (Adult)  Goal: Identify Related Risk Factors and Signs and Symptoms   12/06/19 0451   Infection, Risk/Actual (Adult)   Related Risk Factors (Infection, Risk/Actual) exposure to microbes;prolonged hospitalization;substance abuse   Signs and Symptoms (Infection, Risk/Actual) pain

## 2019-12-06 NOTE — PLAN OF CARE
Problem: Patient Care Overview  Goal: Plan of Care Review  Outcome: Ongoing (interventions implemented as appropriate)   12/06/19 0725 12/06/19 1148   Coping/Psychosocial   Plan of Care Reviewed With patient --    Plan of Care Review   Progress --  improving   OTHER   Outcome Summary --  Patient is feeling better, she has stated that she is breathing much easier. Will continue to monitor.      Goal: Individualization and Mutuality  Outcome: Ongoing (interventions implemented as appropriate)    Goal: Discharge Needs Assessment  Outcome: Ongoing (interventions implemented as appropriate)    Goal: Interprofessional Rounds/Family Conf  Outcome: Ongoing (interventions implemented as appropriate)      Problem: Sepsis/Septic Shock (Adult)  Goal: Signs and Symptoms of Listed Potential Problems Will be Absent, Minimized or Managed (Sepsis/Septic Shock)  Outcome: Ongoing (interventions implemented as appropriate)      Problem: Pain, Chronic (Adult)  Goal: Identify Related Risk Factors and Signs and Symptoms  Outcome: Ongoing (interventions implemented as appropriate)    Goal: Acceptable Pain/Comfort Level and Functional Ability  Outcome: Ongoing (interventions implemented as appropriate)    Goal: Identify Related Risk Factors and Signs and Symptoms  Outcome: Ongoing (interventions implemented as appropriate)    Goal: Acceptable Pain/Comfort Level and Functional Ability  Outcome: Ongoing (interventions implemented as appropriate)      Problem: Infection, Risk/Actual (Adult)  Goal: Identify Related Risk Factors and Signs and Symptoms  Outcome: Ongoing (interventions implemented as appropriate)    Goal: Infection Prevention/Resolution  Outcome: Ongoing (interventions implemented as appropriate)

## 2019-12-06 NOTE — PROGRESS NOTES
"Pharmacy Dosing Service  Antibiotic  Vancomycin    Yesica Boudreaux is a 59 y.o.female from an extended drug rehab facility w/ PNA. Pharmacy consult to dose vancomycin.    PMH: COPD, IVDU, smoker, recent hospitalization at [facility] for a pneumothorax 2/2 heroin overdose 11/28      Assessment/Plan  1. Day #3 vancomycin: Goal -600 and goal trough 10-20 mcg/ml. Patient received 1250 mg (~19 mg/kg ABW) IV x1 followed by 1000 mg (~15 mg/kg ABW) IV q12h. Peak today = 20.8 mcg/mL. Trough today = 14.00 mcg/mL. Calculated AUC = 431.9. True trough = 12.8 mcg/mL. Given therapeutic levels, will continue same dose. Plan on repeat trough in 3-4 days if therapy continued, pending clinical stability.     2. Day #3 ampicillin-sulbactam: 3 g IV q6h appropriate for est CrCl >30 ml/min.     Monitor renal fxn, C&S, pt clinical status, MD A&P.     Relevant clinical data and objective history reviewed:  162.6 cm (64\")   64.9 kg (143 lb 1.3 oz)   Ideal body weight: 54.7 kg (120 lb 9.5 oz)  Adjusted ideal body weight: 58.8 kg (129 lb 9.4 oz)  Body mass index is 24.56 kg/m².    Lab Results   Component Value Date    CREATININE 0.71 12/06/2019    CREATININE 0.74 12/05/2019    CREATININE 0.80 12/04/2019     Estimated Creatinine Clearance: 88.5 mL/min (by C-G formula based on SCr of 0.71 mg/dL).  I/O last 3 completed shifts:  In: 1920 [P.O.:120; I.V.:600; IV Piggyback:1200]  Out: 0     Temperature    12/05/19 1618 12/05/19 1934 12/06/19 0322   Temp: 97.6 °F (36.4 °C) 98.4 °F (36.9 °C) 98.3 °F (36.8 °C)     Lab Results   Component Value Date    WBC 22.70 (H) 12/06/2019    WBC 14.60 (H) 12/05/2019    WBC 16.00 (H) 12/04/2019     No results found for: PROCALCITO    Results from last 7 days   Lab Units 12/06/19  0937 12/06/19  0248   VANCOMYCIN PK mcg/mL  --  20.80   VANCOMYCIN TR mcg/mL 14.00  --        Culture/source/susceptibility:  Microbiology Results (last 10 days)       Procedure Component Value - Date/Time    Legionella Antigen, Urine - " Urine, Urine, Clean Catch [[id number]]  (Normal) Collected:  12/05/19 1645    Lab Status:  Final result Specimen:  Urine, Clean Catch Updated:  12/05/19 1833     LEGIONELLA ANTIGEN, URINE Negative    S. Pneumo Ag Urine or CSF - Urine, Urine, Clean Catch [[id number]]  (Normal) Collected:  12/05/19 1645    Lab Status:  Final result Specimen:  Urine, Clean Catch Updated:  12/05/19 1833     Strep Pneumo Ag Negative    Blood Culture - Blood, Arm, Right [[id number]] Collected:  12/04/19 2133    Lab Status:  Preliminary result Specimen:  Blood from Arm, Right Updated:  12/05/19 2240     Blood Culture No growth at 24 hours    Blood Culture - Blood, Arm, Left [[id number]] Collected:  12/04/19 2017    Lab Status:  Preliminary result Specimen:  Blood from Arm, Left Updated:  12/05/19 2034     Blood Culture No growth at 24 hours           Shreya Bird, PharmD 12/6/201911:05 AM

## 2019-12-07 LAB
ANISOCYTOSIS BLD QL: ABNORMAL
CREAT BLD-MCNC: 0.8 MG/DL (ref 0.57–1)
DEPRECATED RDW RBC AUTO: 45.9 FL (ref 37–54)
ERYTHROCYTE [DISTWIDTH] IN BLOOD BY AUTOMATED COUNT: 14.6 % (ref 12.3–15.4)
GFR SERPL CREATININE-BSD FRML MDRD: 74 ML/MIN/1.73
HCT VFR BLD AUTO: 32.4 % (ref 34–46.6)
HGB BLD-MCNC: 10.6 G/DL (ref 12–15.9)
LYMPHOCYTES # BLD MANUAL: 1.02 10*3/MM3 (ref 0.7–3.1)
LYMPHOCYTES NFR BLD MANUAL: 4 % (ref 5–12)
LYMPHOCYTES NFR BLD MANUAL: 9 % (ref 19.6–45.3)
MCH RBC QN AUTO: 29 PG (ref 26.6–33)
MCHC RBC AUTO-ENTMCNC: 32.6 G/DL (ref 31.5–35.7)
MCV RBC AUTO: 88.9 FL (ref 79–97)
METAMYELOCYTES NFR BLD MANUAL: 2 % (ref 0–0)
MICROCYTES BLD QL: ABNORMAL
MONOCYTES # BLD AUTO: 0.45 10*3/MM3 (ref 0.1–0.9)
NEUTROPHILS # BLD AUTO: 9.61 10*3/MM3 (ref 1.7–7)
NEUTROPHILS NFR BLD MANUAL: 82 % (ref 42.7–76)
NEUTS BAND NFR BLD MANUAL: 3 % (ref 0–5)
PLAT MORPH BLD: NORMAL
PLATELET # BLD AUTO: 340 10*3/MM3 (ref 140–450)
PMV BLD AUTO: 8.3 FL (ref 6–12)
RBC # BLD AUTO: 3.64 10*6/MM3 (ref 3.77–5.28)
SCAN SLIDE: NORMAL
TOXIC GRANULATION: ABNORMAL
WBC NRBC COR # BLD: 11.3 10*3/MM3 (ref 3.4–10.8)

## 2019-12-07 PROCEDURE — 94760 N-INVAS EAR/PLS OXIMETRY 1: CPT

## 2019-12-07 PROCEDURE — 94799 UNLISTED PULMONARY SVC/PX: CPT

## 2019-12-07 PROCEDURE — 85025 COMPLETE CBC W/AUTO DIFF WBC: CPT | Performed by: INTERNAL MEDICINE

## 2019-12-07 PROCEDURE — 25010000003 AMPICILLIN-SULBACTAM PER 1.5 G: Performed by: EMERGENCY MEDICINE

## 2019-12-07 PROCEDURE — 99232 SBSQ HOSP IP/OBS MODERATE 35: CPT | Performed by: INTERNAL MEDICINE

## 2019-12-07 PROCEDURE — 25010000002 VANCOMYCIN 1 G RECONSTITUTED SOLUTION 1 EACH VIAL: Performed by: EMERGENCY MEDICINE

## 2019-12-07 PROCEDURE — 82565 ASSAY OF CREATININE: CPT | Performed by: INTERNAL MEDICINE

## 2019-12-07 PROCEDURE — 85007 BL SMEAR W/DIFF WBC COUNT: CPT | Performed by: INTERNAL MEDICINE

## 2019-12-07 PROCEDURE — 25010000002 METHYLPREDNISOLONE PER 40 MG: Performed by: INTERNAL MEDICINE

## 2019-12-07 RX ORDER — BUTALBITAL, ACETAMINOPHEN AND CAFFEINE 50; 325; 40 MG/1; MG/1; MG/1
2 TABLET ORAL EVERY 6 HOURS PRN
Status: DISCONTINUED | OUTPATIENT
Start: 2019-12-07 | End: 2019-12-09 | Stop reason: HOSPADM

## 2019-12-07 RX ORDER — PREDNISONE 20 MG/1
20 TABLET ORAL
Status: DISCONTINUED | OUTPATIENT
Start: 2019-12-08 | End: 2019-12-09 | Stop reason: HOSPADM

## 2019-12-07 RX ORDER — BISACODYL 5 MG/1
10 TABLET, DELAYED RELEASE ORAL DAILY PRN
Status: DISCONTINUED | OUTPATIENT
Start: 2019-12-07 | End: 2019-12-09 | Stop reason: HOSPADM

## 2019-12-07 RX ADMIN — NICOTINE 1 PATCH: 21 PATCH TRANSDERMAL at 10:39

## 2019-12-07 RX ADMIN — VANCOMYCIN HYDROCHLORIDE 1000 MG: 1 INJECTION, POWDER, LYOPHILIZED, FOR SOLUTION INTRAVENOUS at 10:39

## 2019-12-07 RX ADMIN — AMPICILLIN SODIUM AND SULBACTAM SODIUM 3 G: 2; 1 INJECTION, POWDER, FOR SOLUTION INTRAMUSCULAR; INTRAVENOUS at 22:19

## 2019-12-07 RX ADMIN — IPRATROPIUM BROMIDE AND ALBUTEROL SULFATE 3 ML: .5; 3 SOLUTION RESPIRATORY (INHALATION) at 10:49

## 2019-12-07 RX ADMIN — BUTALBITAL, ACETAMINOPHEN AND CAFFEINE 1 TABLET: 50; 325; 40 TABLET ORAL at 05:21

## 2019-12-07 RX ADMIN — MAGNESIUM HYDROXIDE 10 ML: 2400 SUSPENSION ORAL at 09:41

## 2019-12-07 RX ADMIN — IPRATROPIUM BROMIDE AND ALBUTEROL SULFATE 3 ML: .5; 3 SOLUTION RESPIRATORY (INHALATION) at 20:56

## 2019-12-07 RX ADMIN — BUPRENORPHINE AND NALOXONE 1 TABLET: 8; 2 TABLET SUBLINGUAL at 09:37

## 2019-12-07 RX ADMIN — AMPICILLIN SODIUM AND SULBACTAM SODIUM 3 G: 2; 1 INJECTION, POWDER, FOR SOLUTION INTRAMUSCULAR; INTRAVENOUS at 17:00

## 2019-12-07 RX ADMIN — BUPRENORPHINE AND NALOXONE 1 TABLET: 8; 2 TABLET SUBLINGUAL at 21:14

## 2019-12-07 RX ADMIN — AMPICILLIN SODIUM AND SULBACTAM SODIUM 3 G: 2; 1 INJECTION, POWDER, FOR SOLUTION INTRAMUSCULAR; INTRAVENOUS at 12:00

## 2019-12-07 RX ADMIN — IBUPROFEN 400 MG: 400 TABLET ORAL at 05:21

## 2019-12-07 RX ADMIN — BISACODYL 10 MG: 5 TABLET, COATED ORAL at 10:40

## 2019-12-07 RX ADMIN — IBUPROFEN 400 MG: 400 TABLET ORAL at 21:15

## 2019-12-07 RX ADMIN — Medication 10 ML: at 21:15

## 2019-12-07 RX ADMIN — IPRATROPIUM BROMIDE AND ALBUTEROL SULFATE 3 ML: .5; 3 SOLUTION RESPIRATORY (INHALATION) at 07:04

## 2019-12-07 RX ADMIN — IPRATROPIUM BROMIDE AND ALBUTEROL SULFATE 3 ML: .5; 3 SOLUTION RESPIRATORY (INHALATION) at 15:40

## 2019-12-07 RX ADMIN — VANCOMYCIN HYDROCHLORIDE 1000 MG: 1 INJECTION, POWDER, LYOPHILIZED, FOR SOLUTION INTRAVENOUS at 21:14

## 2019-12-07 RX ADMIN — SERTRALINE HYDROCHLORIDE 25 MG: 50 TABLET ORAL at 10:42

## 2019-12-07 RX ADMIN — Medication 10 ML: at 10:42

## 2019-12-07 RX ADMIN — BUTALBITAL, ACETAMINOPHEN AND CAFFEINE 2 TABLET: 50; 325; 40 TABLET ORAL at 21:15

## 2019-12-07 RX ADMIN — AMPICILLIN SODIUM AND SULBACTAM SODIUM 3 G: 2; 1 INJECTION, POWDER, FOR SOLUTION INTRAMUSCULAR; INTRAVENOUS at 04:35

## 2019-12-07 RX ADMIN — BUTALBITAL, ACETAMINOPHEN AND CAFFEINE 2 TABLET: 50; 325; 40 TABLET ORAL at 10:40

## 2019-12-07 RX ADMIN — METHYLPREDNISOLONE SODIUM SUCCINATE 40 MG: 40 INJECTION, POWDER, FOR SOLUTION INTRAMUSCULAR; INTRAVENOUS at 04:36

## 2019-12-07 NOTE — PLAN OF CARE
Patient has stated that she is feeling much better. WBC count has decreased from 22 yesterday to 11 today. Possibility to discharge tomorrow. Will continue to monitor.

## 2019-12-07 NOTE — PROGRESS NOTES
"Pharmacy Dosing Service  Antibiotic  Vancomycin    Yesica Boudreaux is a 59 y.o.female from an extended drug rehab facility w/ PNA. Pharmacy consult to dose vancomycin.    PMH: COPD, IVDU, smoker, recent hospitalization at [facility] for a pneumothorax 2/2 heroin overdose 11/28      Assessment/Plan  1. Day #4 vancomycin: Goal -600 and goal trough 10-20 mcg/ml. Patient receiving 1000 mg (~15 mg/kg ABW) IV q12h. Last AUC/trough on 12/6 = 431 and 12.8 mcg/mL respectively. Plan on repeat trough ion 12/9 if therapy continued pending clinical stability - have not yet ordered.     2. Day #4 ampicillin-sulbactam: 3 g IV q6h appropriate for est CrCl >30 ml/min.     Monitor renal fxn, C&S, pt clinical status, MD A&P.     Relevant clinical data and objective history reviewed:  162.6 cm (64\")   64.9 kg (143 lb 1.3 oz)   Ideal body weight: 54.7 kg (120 lb 9.5 oz)  Adjusted ideal body weight: 58.8 kg (129 lb 9.4 oz)  Body mass index is 24.56 kg/m².    Lab Results   Component Value Date    CREATININE 0.80 12/07/2019    CREATININE 0.71 12/06/2019    CREATININE 0.74 12/05/2019     Estimated Creatinine Clearance: 78.5 mL/min (by C-G formula based on SCr of 0.8 mg/dL).  I/O last 3 completed shifts:  In: 1280 [P.O.:1080; IV Piggyback:200]  Out: 0     Temperature    12/06/19 1129 12/06/19 1931 12/07/19 0349   Temp: 98 °F (36.7 °C) 98.4 °F (36.9 °C) 98 °F (36.7 °C)     Lab Results   Component Value Date    WBC 22.70 (H) 12/06/2019    WBC 14.60 (H) 12/05/2019    WBC 16.00 (H) 12/04/2019     No results found for: PROCALCITO    Results from last 7 days   Lab Units 12/06/19  0937 12/06/19  0248   VANCOMYCIN PK mcg/mL  --  20.80   VANCOMYCIN TR mcg/mL 14.00  --        Culture/source/susceptibility:  Microbiology Results (last 10 days)       Procedure Component Value - Date/Time    Respiratory Culture - Sputum, Cough [[id number]] Collected:  12/06/19 1045    Lab Status:  Preliminary result Specimen:  Sputum from Cough Updated:  12/06/19 1304 "     Gram Stain Moderate (3+) WBCs per low power field      Occasional Gram positive cocci    Legionella Antigen, Urine - Urine, Urine, Clean Catch [[id number]]  (Normal) Collected:  12/05/19 1645    Lab Status:  Final result Specimen:  Urine, Clean Catch Updated:  12/05/19 1833     LEGIONELLA ANTIGEN, URINE Negative    S. Pneumo Ag Urine or CSF - Urine, Urine, Clean Catch [[id number]]  (Normal) Collected:  12/05/19 1645    Lab Status:  Final result Specimen:  Urine, Clean Catch Updated:  12/05/19 1833     Strep Pneumo Ag Negative    Blood Culture - Blood, Arm, Right [[id number]] Collected:  12/04/19 2133    Lab Status:  Preliminary result Specimen:  Blood from Arm, Right Updated:  12/06/19 2220     Blood Culture No growth at 2 days    Blood Culture - Blood, Arm, Left [[id number]] Collected:  12/04/19 2017    Lab Status:  Preliminary result Specimen:  Blood from Arm, Left Updated:  12/06/19 2033     Blood Culture No growth at 2 days           Shreya Bird, PharmD 12/7/20198:47 AM

## 2019-12-07 NOTE — PLAN OF CARE
Problem: Patient Care Overview  Goal: Plan of Care Review  Outcome: Ongoing (interventions implemented as appropriate)  Flowsheets (Taken 12/7/2019 9486)  Progress: improving  Plan of Care Reviewed With: patient  Outcome Summary: Patient in bed eyes closed resting.  Patients states breathing better and slept the best tonight.  Took PRN pain meds for c/o migrane.

## 2019-12-07 NOTE — PROGRESS NOTES
[facility]   INPATIENT PROGRESS NOTE    Date of Admission: 12/4/2019  Length of Stay: 2  Primary Care Physician: Oralia Senior    Subjective    Feels so and so  Some headche  Subjective   CC: fever without chills, productive cough and SOB    HPI:  59 y.o.  female with a history of HTN, heroin abuse, migraines and CAD.  She presented to [facility] Emergency room 12/4/19 with fever, cough and SOA. Pt states that she recently was hospitalized at [facility] with a pneumothorax secondary to CPR being performed. Pt states she had overdosed on heroin on Thanksgiving and family members performed CPR until EMS arrived. Pt states she is now at [facility]. Pt states she started to not feel well on 12/3/19, with fever of 103.0, cough, chest pain, congestion and SOA. She Pt states the pain in her chest is worse with cough, 5 out of 10 and eases at rest. Pt states she has noticed some wheezing and increase in SOA. Pt denies Nausea, vomiting, night sweats, hemoptysis or swelling in legs.      In the ED pt was found to be hypoxic with O2 sats less than 90% and placed on oxygen at 2L per NC. Febrile  Temp of 103.0, WBC 16, lactate 0.8, blood cultures pending. CXR: Small rt apical pneumothorax without mediatinal shift, extensive density in the RLL post obstructive pneumonia or atelectasis. Smaller patchy density in the LLL is favored to represent Pneumonia. Pt received DuoNeb x 1, Solu medrol 125mg IV, Vancomycin and Unasyn for pneumonia.   Pt will be admitted for further evaluation and workup.     Pt triggered sepsis with Temp 103.0, hypoxia, WBC 16.0 and Pneumonia.             Review Of Systems:     Constitution: Positive for  weakness and malaise/fatigue. Negative for night sweats.   HENT: Negative.    Eyes: Negative.    Cardiovascular: Positive for R side pleuritic chest pain and dyspnea on exertion. Negative for leg swelling.   Respiratory: Positive for cough, shortness of breath and wheezing.  Negative for hemoptysis.    Endocrine: Negative.    Hematologic/Lymphatic: Negative.    Skin: Negative.    Musculoskeletal: Negative.    Gastrointestinal: Negative.  Negative for nausea and vomiting.   Genitourinary: Negative.    Psychiatric/Behavioral: Negative.    Allergic/Immunologic: Negative.    All other systems reviewed and are negative.      Objective     fever resolved.     looks comfortable  Objective    Physical Exam:  Temp:  [98 °F (36.7 °C)-98.4 °F (36.9 °C)] 98 °F (36.7 °C)  Heart Rate:  [56-85] 74  Resp:  [14-18] 16  BP: (138-144)/(71-77) 144/77    Constitutional: She is oriented to person, place, and time. She appears well-developed and well-nourished. No distress.   HENT:   Head: Normocephalic and atraumatic.   Eyes: Conjunctivae and EOM are normal.   Neck: Normal range of motion. Neck supple.   Cardiovascular: Normal rate, regular rhythm, normal heart sounds and intact distal pulses.   Pulmonary/Chest: Effort normal. No respiratory distress.   wheezes. Basal rales and diminished bs on both lungs  Abdominal: Soft. Bowel sounds are normal.   Musculoskeletal: Normal range of motion.   Neurological: She is alert and oriented to person, place, and time.   Skin: Skin is warm and dry.     Results Review:    I have personally reviewed most recent cardiac tracings, lab results, microbiology results and radiology images and interpretations and agree with findings, most notably:  .      Results from last 7 days   Lab Units 12/06/19  0249 12/05/19 0451 12/04/19 2017   WBC 10*3/mm3 22.70* 14.60* 16.00*   HEMOGLOBIN g/dL 10.6* 11.6* 11.9*   HEMATOCRIT % 31.7* 35.5 36.0   PLATELETS 10*3/mm3 310 295 298     Results from last 7 days   Lab Units 12/07/19  0635 12/06/19  0248 12/05/19 0451 12/04/19 2017   SODIUM mmol/L  --   --  139 134*   POTASSIUM mmol/L  --   --  5.1 4.5   CHLORIDE mmol/L  --   --  100 93*   CO2 mmol/L  --   --  31.0* 31.0*   BUN mg/dL  --   --  16 15   CREATININE mg/dL 0.80 0.71 0.74 0.80    GLUCOSE mg/dL  --   --  201* 111*   CALCIUM mg/dL  --   --  9.1 9.0   ALK PHOS U/L  --   --   --  92   ALT (SGPT) U/L  --   --   --  18   AST (SGOT) U/L  --   --   --  19     Hemoglobin A1C (%)   Date/Time Value   12/05/2019 0940 5.5     Microbiology Results Abnormal     Procedure Component Value - Date/Time    Blood Culture - Blood, Arm, Right [[id number]] Collected:  12/04/19 2133    Lab Status:  Preliminary result Specimen:  Blood from Arm, Right Updated:  12/06/19 2220     Blood Culture No growth at 2 days    Blood Culture - Blood, Arm, Left [[id number]] Collected:  12/04/19 2017    Lab Status:  Preliminary result Specimen:  Blood from Arm, Left Updated:  12/06/19 2033     Blood Culture No growth at 2 days    Respiratory Culture - Sputum, Cough [[id number]] Collected:  12/06/19 1045    Lab Status:  Preliminary result Specimen:  Sputum from Cough Updated:  12/06/19 1304     Gram Stain Moderate (3+) WBCs per low power field      Occasional Gram positive cocci    Legionella Antigen, Urine - Urine, Urine, Clean Catch [[id number]]  (Normal) Collected:  12/05/19 1645    Lab Status:  Final result Specimen:  Urine, Clean Catch Updated:  12/05/19 1833     LEGIONELLA ANTIGEN, URINE Negative    S. Pneumo Ag Urine or CSF - Urine, Urine, Clean Catch [[id number]]  (Normal) Collected:  12/05/19 1645    Lab Status:  Final result Specimen:  Urine, Clean Catch Updated:  12/05/19 1833     Strep Pneumo Ag Negative        No radiology results for the last day         I have reviewed the medications.    Assessment/Plan   Assessment/Plan   Brief Hospital Course:      Active Hospital Problems:  * Acute respiratory failure with hypoxia (CMS/HCC)- (present on admission)  Due to pneumonia and COPD exa  Resolved , on RA    COPD exacerbation (CMS/HCC)- (present on admission)  Duoneb, flutter valve, abx, iv steroid - will taper steroid off  Continue O2  Counseled about tobacco cessation     Severe sepsis (CMS/HCC)- (present on admission)  -  triggered sepsis due to RLL pneumonia, elevated temp 103.1, hypoxia requiring 4L per NC, WBC 16, lactate 0.8  -Blood cultures - NG  -off fluid  - resolved    Community acquired pneumonia, bilateral- (present on admission)  -CXR: RLL PNA  -Continue Unasyn and Vancomycin - will deescalate abx if continues doing well by tomorrow  -Oxygen as need to keep sats 90-95% - improved in O2 demand  -Duonebs PRN for Wheezing, SOA  - sputum, blood cx pending  - urine legionella and strep ag - neg  - fever resolve  - leukocytosis up due to steroid,        Heroin addiction (CMS/HCC)- (present on admission)   -Pt with recent overdose, currently at [facility] - will go back there  -On Suboxone  - denies any further abuse or hep C or HIV in the past  - neg HIV, neg hep A and B. No hep C done - but reports neg in the past    Tobacco use- (present on admission)  -Nicotine patch  -Encourage lifestyle modification    Headache, migraine- (present on admission)  -resumed Fioricet in past   - better           DVT prophylaxis:lovenox  Discharge Planning: I expect patient to be discharged to rehab in a few days.    Shelly Lee MD, 12/05/19 3:07 PM

## 2019-12-07 NOTE — PLAN OF CARE
Problem: Patient Care Overview  Goal: Plan of Care Review  12/7/2019 0530 by Becky Chadwick LPN  Outcome: Ongoing (interventions implemented as appropriate)  Flowsheets  Taken 12/7/2019 0530  Progress: improving  Plan of Care Reviewed With: patient  Taken 12/7/2019 0504  Outcome Summary: Patient in bed eyes closed resting.  Patients states breathing better and slept the best tonight.  Took PRN pain meds for c/o migrane.

## 2019-12-08 LAB
BACTERIA SPEC RESP CULT: ABNORMAL
BACTERIA SPEC RESP CULT: ABNORMAL
BASOPHILS # BLD AUTO: 0.1 10*3/MM3 (ref 0–0.2)
BASOPHILS NFR BLD AUTO: 0.9 % (ref 0–1.5)
CREAT BLD-MCNC: 0.76 MG/DL (ref 0.57–1)
DEPRECATED RDW RBC AUTO: 45.1 FL (ref 37–54)
EOSINOPHIL # BLD AUTO: 0.2 10*3/MM3 (ref 0–0.4)
EOSINOPHIL NFR BLD AUTO: 2 % (ref 0.3–6.2)
ERYTHROCYTE [DISTWIDTH] IN BLOOD BY AUTOMATED COUNT: 14.4 % (ref 12.3–15.4)
GFR SERPL CREATININE-BSD FRML MDRD: 78 ML/MIN/1.73
GRAM STN SPEC: ABNORMAL
GRAM STN SPEC: ABNORMAL
HCT VFR BLD AUTO: 31.7 % (ref 34–46.6)
HGB BLD-MCNC: 10.9 G/DL (ref 12–15.9)
LYMPHOCYTES # BLD AUTO: 2.4 10*3/MM3 (ref 0.7–3.1)
LYMPHOCYTES NFR BLD AUTO: 24.4 % (ref 19.6–45.3)
MCH RBC QN AUTO: 30.6 PG (ref 26.6–33)
MCHC RBC AUTO-ENTMCNC: 34.5 G/DL (ref 31.5–35.7)
MCV RBC AUTO: 88.7 FL (ref 79–97)
MONOCYTES # BLD AUTO: 0.8 10*3/MM3 (ref 0.1–0.9)
MONOCYTES NFR BLD AUTO: 8 % (ref 5–12)
NEUTROPHILS # BLD AUTO: 6.3 10*3/MM3 (ref 1.7–7)
NEUTROPHILS NFR BLD AUTO: 64.7 % (ref 42.7–76)
NRBC BLD AUTO-RTO: 0 /100 WBC (ref 0–0.2)
PLATELET # BLD AUTO: 323 10*3/MM3 (ref 140–450)
PMV BLD AUTO: 7.8 FL (ref 6–12)
RBC # BLD AUTO: 3.57 10*6/MM3 (ref 3.77–5.28)
WBC NRBC COR # BLD: 9.7 10*3/MM3 (ref 3.4–10.8)

## 2019-12-08 PROCEDURE — 85025 COMPLETE CBC W/AUTO DIFF WBC: CPT | Performed by: INTERNAL MEDICINE

## 2019-12-08 PROCEDURE — 94799 UNLISTED PULMONARY SVC/PX: CPT

## 2019-12-08 PROCEDURE — 25010000003 AMPICILLIN-SULBACTAM PER 1.5 G: Performed by: EMERGENCY MEDICINE

## 2019-12-08 PROCEDURE — 99232 SBSQ HOSP IP/OBS MODERATE 35: CPT | Performed by: INTERNAL MEDICINE

## 2019-12-08 PROCEDURE — 63710000001 PREDNISONE PER 1 MG: Performed by: INTERNAL MEDICINE

## 2019-12-08 PROCEDURE — 82565 ASSAY OF CREATININE: CPT | Performed by: INTERNAL MEDICINE

## 2019-12-08 RX ORDER — AZITHROMYCIN 250 MG/1
500 TABLET, FILM COATED ORAL
Status: DISCONTINUED | OUTPATIENT
Start: 2019-12-08 | End: 2019-12-09 | Stop reason: HOSPADM

## 2019-12-08 RX ADMIN — IBUPROFEN 400 MG: 400 TABLET ORAL at 17:28

## 2019-12-08 RX ADMIN — BUPRENORPHINE AND NALOXONE 1 TABLET: 8; 2 TABLET SUBLINGUAL at 08:32

## 2019-12-08 RX ADMIN — IPRATROPIUM BROMIDE AND ALBUTEROL SULFATE 3 ML: .5; 3 SOLUTION RESPIRATORY (INHALATION) at 06:58

## 2019-12-08 RX ADMIN — PREDNISONE 20 MG: 20 TABLET ORAL at 08:32

## 2019-12-08 RX ADMIN — AMPICILLIN SODIUM AND SULBACTAM SODIUM 3 G: 2; 1 INJECTION, POWDER, FOR SOLUTION INTRAMUSCULAR; INTRAVENOUS at 21:04

## 2019-12-08 RX ADMIN — AZITHROMYCIN 500 MG: 250 TABLET, FILM COATED ORAL at 14:26

## 2019-12-08 RX ADMIN — IBUPROFEN 400 MG: 400 TABLET ORAL at 05:10

## 2019-12-08 RX ADMIN — Medication 10 ML: at 09:36

## 2019-12-08 RX ADMIN — AMPICILLIN SODIUM AND SULBACTAM SODIUM 3 G: 2; 1 INJECTION, POWDER, FOR SOLUTION INTRAMUSCULAR; INTRAVENOUS at 16:32

## 2019-12-08 RX ADMIN — ACETAMINOPHEN 650 MG: 325 TABLET ORAL at 08:40

## 2019-12-08 RX ADMIN — BISACODYL 10 MG: 5 TABLET, COATED ORAL at 08:32

## 2019-12-08 RX ADMIN — Medication 10 ML: at 21:06

## 2019-12-08 RX ADMIN — SERTRALINE HYDROCHLORIDE 25 MG: 50 TABLET ORAL at 08:32

## 2019-12-08 RX ADMIN — BUTALBITAL, ACETAMINOPHEN AND CAFFEINE 2 TABLET: 50; 325; 40 TABLET ORAL at 05:10

## 2019-12-08 RX ADMIN — BUTALBITAL, ACETAMINOPHEN AND CAFFEINE 2 TABLET: 50; 325; 40 TABLET ORAL at 11:29

## 2019-12-08 RX ADMIN — IPRATROPIUM BROMIDE AND ALBUTEROL SULFATE 3 ML: .5; 3 SOLUTION RESPIRATORY (INHALATION) at 11:52

## 2019-12-08 RX ADMIN — MAGNESIUM HYDROXIDE 10 ML: 2400 SUSPENSION ORAL at 08:32

## 2019-12-08 RX ADMIN — NICOTINE 1 PATCH: 21 PATCH TRANSDERMAL at 11:22

## 2019-12-08 RX ADMIN — IBUPROFEN 400 MG: 400 TABLET ORAL at 11:29

## 2019-12-08 RX ADMIN — IPRATROPIUM BROMIDE AND ALBUTEROL SULFATE 3 ML: .5; 3 SOLUTION RESPIRATORY (INHALATION) at 18:53

## 2019-12-08 RX ADMIN — BUPRENORPHINE AND NALOXONE 1 TABLET: 8; 2 TABLET SUBLINGUAL at 21:04

## 2019-12-08 RX ADMIN — BUTALBITAL, ACETAMINOPHEN AND CAFFEINE 2 TABLET: 50; 325; 40 TABLET ORAL at 17:28

## 2019-12-08 RX ADMIN — AMPICILLIN SODIUM AND SULBACTAM SODIUM 3 G: 2; 1 INJECTION, POWDER, FOR SOLUTION INTRAMUSCULAR; INTRAVENOUS at 04:18

## 2019-12-08 RX ADMIN — AMPICILLIN SODIUM AND SULBACTAM SODIUM 3 G: 2; 1 INJECTION, POWDER, FOR SOLUTION INTRAMUSCULAR; INTRAVENOUS at 11:22

## 2019-12-08 RX ADMIN — BUTALBITAL, ACETAMINOPHEN AND CAFFEINE 2 TABLET: 50; 325; 40 TABLET ORAL at 23:39

## 2019-12-08 NOTE — PROGRESS NOTES
[facility]   INPATIENT PROGRESS NOTE    Date of Admission: 12/4/2019  Length of Stay: 3  Primary Care Physician: Oralia Senior    Subjective    Feels much better  Subjective   CC: fever without chills, productive cough and SOB    HPI:  59 y.o.  female with a history of HTN, heroin abuse, migraines and CAD.  She presented to [facility] Emergency room 12/4/19 with fever, cough and SOA. Pt states that she recently was hospitalized at [facility] with a pneumothorax secondary to CPR being performed. Pt states she had overdosed on heroin on Thanksgiving and family members performed CPR until EMS arrived. Pt states she is now at [facility] rehab. Pt states she started to not feel well on 12/3/19, with fever of 103.0, cough, chest pain, congestion and SOA. She Pt states the pain in her chest is worse with cough, 5 out of 10 and eases at rest. Pt states she has noticed some wheezing and increase in SOA. Pt denies Nausea, vomiting, night sweats, hemoptysis or swelling in legs.      In the ED pt was found to be hypoxic with O2 sats less than 90% and placed on oxygen at 2L per NC. Febrile  Temp of 103.0, WBC 16, lactate 0.8, blood cultures pending. CXR: Small rt apical pneumothorax without mediatinal shift, extensive density in the RLL post obstructive pneumonia or atelectasis. Smaller patchy density in the LLL is favored to represent Pneumonia. Pt received DuoNeb x 1, Solu medrol 125mg IV, Vancomycin and Unasyn for pneumonia.   Pt will be admitted for further evaluation and workup.     Pt triggered sepsis with Temp 103.0, hypoxia, WBC 16.0 and Pneumonia.             Review Of Systems:     Constitution: Positive for  weakness and malaise/fatigue. Negative for night sweats.   HENT: Negative.    Eyes: Negative.    Cardiovascular: Positive for R side pleuritic chest pain and dyspnea on exertion. Negative for leg swelling.   Respiratory: Positive for cough, shortness of breath and wheezing. Negative for  hemoptysis.    Endocrine: Negative.    Hematologic/Lymphatic: Negative.    Skin: Negative.    Musculoskeletal: Negative.    Gastrointestinal: Negative.  Negative for nausea and vomiting.   Genitourinary: Negative.    Psychiatric/Behavioral: Negative.    Allergic/Immunologic: Negative.    All other systems reviewed and are negative.      Objective     fever resolved.  ambulating      Objective    Physical Exam:  Temp:  [97.8 °F (36.6 °C)-98.1 °F (36.7 °C)] 97.8 °F (36.6 °C)  Heart Rate:  [64-89] 70  Resp:  [16-18] 16  BP: (139-146)/(80-85) 139/85    Constitutional: She is oriented to person, place, and time. She appears well-developed and well-nourished. No distress.   HENT:   Head: Normocephalic and atraumatic.   Eyes: Conjunctivae and EOM are normal.   Neck: Normal range of motion. Neck supple.   Cardiovascular: Normal rate, regular rhythm, normal heart sounds and intact distal pulses.   Pulmonary/Chest: Effort normal. No respiratory distress.   wheezes. Basal rales and diminished bs on both lungs  Abdominal: Soft. Bowel sounds are normal.   Musculoskeletal: Normal range of motion.   Neurological: She is alert and oriented to person, place, and time.   Skin: Skin is warm and dry.     Results Review:    I have personally reviewed most recent cardiac tracings, lab results, microbiology results and radiology images and interpretations and agree with findings, most notably:  .      Results from last 7 days   Lab Units 12/08/19  0830 12/07/19  1212 12/06/19  0249   WBC 10*3/mm3 9.70 11.30* 22.70*   HEMOGLOBIN g/dL 10.9* 10.6* 10.6*   HEMATOCRIT % 31.7* 32.4* 31.7*   PLATELETS 10*3/mm3 323 340 310     Results from last 7 days   Lab Units 12/08/19  0503 12/07/19  0635 12/06/19  0248 12/05/19  0451 12/04/19 2017   SODIUM mmol/L  --   --   --  139 134*   POTASSIUM mmol/L  --   --   --  5.1 4.5   CHLORIDE mmol/L  --   --   --  100 93*   CO2 mmol/L  --   --   --  31.0* 31.0*   BUN mg/dL  --   --   --  16 15   CREATININE  mg/dL 0.76 0.80 0.71 0.74 0.80   GLUCOSE mg/dL  --   --   --  201* 111*   CALCIUM mg/dL  --   --   --  9.1 9.0   ALK PHOS U/L  --   --   --   --  92   ALT (SGPT) U/L  --   --   --   --  18   AST (SGOT) U/L  --   --   --   --  19     Hemoglobin A1C (%)   Date/Time Value   12/05/2019 0940 5.5     Microbiology Results Abnormal     Procedure Component Value - Date/Time    Respiratory Culture - Sputum, Cough [[id number]]  (Abnormal)  (Susceptibility) Collected:  12/06/19 1045    Lab Status:  Final result Specimen:  Sputum from Cough Updated:  12/08/19 1052     Respiratory Culture Scant growth (1+) Klebsiella pneumoniae ssp pneumoniae      Scant growth (1+) Normal Respiratory Edita     Gram Stain Moderate (3+) WBCs per low power field      Occasional Gram positive cocci    Susceptibility      Klebsiella pneumoniae ssp pneumoniae     NICKY     Ampicillin Resistant     Ampicillin + Sulbactam Susceptible     Cefepime Susceptible     Ceftazidime Susceptible     Ceftriaxone Susceptible     Gentamicin Susceptible     Levofloxacin Susceptible     Piperacillin + Tazobactam Susceptible     Tetracycline Susceptible     Trimethoprim + Sulfamethoxazole Susceptible                Susceptibility Comments     Klebsiella pneumoniae ssp pneumoniae    Cefazolin sensitivity will not be reported for Enterobacteriaceae in non-urine isolates. If cefazolin is preferred, please call the microbiology lab to request an E-test.             Blood Culture - Blood, Arm, Right [[id number]] Collected:  12/04/19 2133    Lab Status:  Preliminary result Specimen:  Blood from Arm, Right Updated:  12/07/19 2145     Blood Culture No growth at 3 days    Blood Culture - Blood, Arm, Left [[id number]] Collected:  12/04/19 2017    Lab Status:  Preliminary result Specimen:  Blood from Arm, Left Updated:  12/07/19 2030     Blood Culture No growth at 3 days    Legionella Antigen, Urine - Urine, Urine, Clean Catch [[id number]]  (Normal) Collected:  12/05/19 1645    Lab  Status:  Final result Specimen:  Urine, Clean Catch Updated:  12/05/19 1833     LEGIONELLA ANTIGEN, URINE Negative    S. Pneumo Ag Urine or CSF - Urine, Urine, Clean Catch [[id number]]  (Normal) Collected:  12/05/19 1645    Lab Status:  Final result Specimen:  Urine, Clean Catch Updated:  12/05/19 1833     Strep Pneumo Ag Negative        No radiology results for the last day         I have reviewed the medications.    Assessment/Plan   Assessment/Plan   Brief Hospital Course:      Active Hospital Problems:  * Acute respiratory failure with hypoxia (CMS/HCC)- (present on admission)  Due to pneumonia and COPD exa  Resolved , on RA    COPD exacerbation (CMS/[facility])- (present on admission)  Duoneb, flutter valve, abx, iv steroid - will taper steroid off  Continue O2  Counseled about tobacco cessation     Severe sepsis (CMS/[facility])- (present on admission)  - triggered sepsis due to RLL pneumonia, elevated temp 103.1, hypoxia requiring 4L per NC, WBC 16, lactate 0.8  -Blood cultures - NG  -off fluid  - resolved    Community acquired pneumonia, bilateral- (present on admission)  -CXR: RLL PNA  -Continue Unasyn and Vancomycin - will stop vanco, sputum cx- kleb P. Scan amount. Add zithromax  -Oxygen as need to keep sats 90-95% - improved in O2 demand  -Duonebs PRN for Wheezing, SOA  - sputum cx- Kleb P, blood cx -NG  - urine legionella and strep ag - neg  - fever resolved  - leukocytosis resolved  - clinically improved, can ambulate without SOB  - will change to po abx tomorrow for discharge         Heroin addiction (CMS/[facility])- (present on admission)   -Pt with recent overdose, currently at [facility] - will go back there  -On Suboxone  - denies any further abuse or hep C or HIV in the past  - neg HIV, neg hep A and B. No hep C done - but reports neg in the past    Tobacco use- (present on admission)  -Nicotine patch  -Encourage lifestyle modification    Headache, migraine- (present on admission)  -resumed Fioricet in past   -  better           DVT prophylaxis:lovenox  Discharge Planning: I expect patient to be discharged to rehab in a few days.    Shelly Lee MD, 12/05/19 3:07 PM

## 2019-12-08 NOTE — PLAN OF CARE
Patient has been pleasant throughout the shift. She is anticipating to discharge back to [facility] tomorrow. Will continue to monitor.

## 2019-12-08 NOTE — PLAN OF CARE
Problem: Patient Care Overview  Goal: Plan of Care Review  Outcome: Ongoing (interventions implemented as appropriate)  Flowsheets (Taken 12/8/2019 5952)  Progress: improving  Plan of Care Reviewed With: patient  Outcome Summary: Patient took PRN pain meds for c/o migrane.  No c/o SOA. Patient up with bathroom privileges.

## 2019-12-09 VITALS
DIASTOLIC BLOOD PRESSURE: 76 MMHG | TEMPERATURE: 98.3 F | BODY MASS INDEX: 23.9 KG/M2 | HEIGHT: 64 IN | WEIGHT: 140 LBS | RESPIRATION RATE: 16 BRPM | HEART RATE: 58 BPM | SYSTOLIC BLOOD PRESSURE: 154 MMHG | OXYGEN SATURATION: 95 %

## 2019-12-09 LAB
BACTERIA SPEC AEROBE CULT: NORMAL
BACTERIA SPEC AEROBE CULT: NORMAL
CREAT BLD-MCNC: 0.68 MG/DL (ref 0.57–1)
GFR SERPL CREATININE-BSD FRML MDRD: 89 ML/MIN/1.73

## 2019-12-09 PROCEDURE — 94799 UNLISTED PULMONARY SVC/PX: CPT

## 2019-12-09 PROCEDURE — 63710000001 PREDNISONE PER 1 MG: Performed by: INTERNAL MEDICINE

## 2019-12-09 PROCEDURE — 99239 HOSP IP/OBS DSCHRG MGMT >30: CPT | Performed by: INTERNAL MEDICINE

## 2019-12-09 PROCEDURE — 82565 ASSAY OF CREATININE: CPT | Performed by: INTERNAL MEDICINE

## 2019-12-09 PROCEDURE — 25010000003 AMPICILLIN-SULBACTAM PER 1.5 G: Performed by: EMERGENCY MEDICINE

## 2019-12-09 RX ORDER — BUTALBITAL, ACETAMINOPHEN AND CAFFEINE 50; 325; 40 MG/1; MG/1; MG/1
2 TABLET ORAL EVERY 6 HOURS PRN
Qty: 5 TABLET | Refills: 0 | Status: SHIPPED | OUTPATIENT
Start: 2019-12-09

## 2019-12-09 RX ORDER — SERTRALINE HYDROCHLORIDE 25 MG/1
25 TABLET, FILM COATED ORAL DAILY
Qty: 10 TABLET | Refills: 0 | Status: SHIPPED | OUTPATIENT
Start: 2019-12-10

## 2019-12-09 RX ORDER — SACCHAROMYCES BOULARDII 250 MG
250 CAPSULE ORAL 2 TIMES DAILY
Qty: 14 CAPSULE | Refills: 0 | Status: SHIPPED | OUTPATIENT
Start: 2019-12-09

## 2019-12-09 RX ORDER — AZITHROMYCIN 500 MG/1
500 TABLET, FILM COATED ORAL DAILY
Qty: 3 TABLET | Refills: 0 | Status: SHIPPED | OUTPATIENT
Start: 2019-12-09

## 2019-12-09 RX ORDER — CEFDINIR 300 MG/1
300 CAPSULE ORAL 2 TIMES DAILY
Qty: 14 CAPSULE | Refills: 0 | Status: SHIPPED | OUTPATIENT
Start: 2019-12-09 | End: 2019-12-16

## 2019-12-09 RX ADMIN — SERTRALINE HYDROCHLORIDE 25 MG: 50 TABLET ORAL at 09:59

## 2019-12-09 RX ADMIN — Medication 10 ML: at 10:02

## 2019-12-09 RX ADMIN — BUTALBITAL, ACETAMINOPHEN AND CAFFEINE 2 TABLET: 50; 325; 40 TABLET ORAL at 12:34

## 2019-12-09 RX ADMIN — IPRATROPIUM BROMIDE AND ALBUTEROL SULFATE 3 ML: .5; 3 SOLUTION RESPIRATORY (INHALATION) at 11:16

## 2019-12-09 RX ADMIN — BUPRENORPHINE AND NALOXONE 1 TABLET: 8; 2 TABLET SUBLINGUAL at 09:59

## 2019-12-09 RX ADMIN — IBUPROFEN 400 MG: 400 TABLET ORAL at 05:51

## 2019-12-09 RX ADMIN — AMPICILLIN SODIUM AND SULBACTAM SODIUM 3 G: 2; 1 INJECTION, POWDER, FOR SOLUTION INTRAMUSCULAR; INTRAVENOUS at 03:57

## 2019-12-09 RX ADMIN — IPRATROPIUM BROMIDE AND ALBUTEROL SULFATE 3 ML: .5; 3 SOLUTION RESPIRATORY (INHALATION) at 07:30

## 2019-12-09 RX ADMIN — AMPICILLIN SODIUM AND SULBACTAM SODIUM 3 G: 2; 1 INJECTION, POWDER, FOR SOLUTION INTRAMUSCULAR; INTRAVENOUS at 09:58

## 2019-12-09 RX ADMIN — NICOTINE 1 PATCH: 21 PATCH TRANSDERMAL at 10:02

## 2019-12-09 RX ADMIN — PREDNISONE 20 MG: 20 TABLET ORAL at 09:59

## 2019-12-09 RX ADMIN — BUTALBITAL, ACETAMINOPHEN AND CAFFEINE 2 TABLET: 50; 325; 40 TABLET ORAL at 05:51

## 2019-12-09 RX ADMIN — AZITHROMYCIN 500 MG: 250 TABLET, FILM COATED ORAL at 10:01

## 2019-12-09 NOTE — DISCHARGE SUMMARY
[facility]   DISCHARGE SUMMARY    Patient Name: Yesica Boudreaux  : 1960  MRN: [id number]    Date of Admission: 2019  Date of Discharge:  2019    Primary Care Physician: Oralia Senior    Consults     No orders found from 2019 to 2019.          Hospital Course     Presenting Problem:   Acute exacerbation of chronic obstructive pulmonary disease (COPD) (CMS/Formerly [facility]) [J44.1]  History of pneumothorax [Z87.09]  History of heroin abuse (CMS/Formerly [facility]) [F11.11]  Pneumonia of right middle lobe due to infectious organism (CMS/HCC) [J18.1]    Active Hospital Problems:  * Acute respiratory failure with hypoxia (CMS/HCC)- (present on admission)  Due to pneumonia and COPD exa  Resolved , on RA    COPD exacerbation (CMS/Formerly [facility])- (present on admission)  Duoneb, flutter valve, abx, iv steroid - will taper steroid off  Continue O2  Counseled about tobacco cessation     Severe sepsis (CMS/HCC)- (present on admission)  - triggered sepsis due to RLL pneumonia, elevated temp 103.1, hypoxia requiring 4L per NC, WBC 16, lactate 0.8  -Blood cultures - NG  -off fluid  - resolved    Community acquired pneumonia, bilateral- (present on admission)  -CXR: RLL >LLL, bilateral PNA  -Continue Unasyn and Vancomycin - will stop vanco, sputum cx- kleb P. Scan amount. Add zithromax  -Oxygen as need to keep sats 90-95% - improved in O2 demand  -Duonebs PRN for Wheezing, SOA  - sputum cx- Kleb P, blood cx -NG  - urine legionella and strep ag - neg  - fever resolved  - leukocytosis resolved  - clinically improved, can ambulate without SOB  - will change to po abx tomorrow for discharge         Heroin addiction (CMS/Formerly [facility])- (present on admission)   -Pt with recent overdose, currently at [facility] - will go back there  -On Suboxone  - denies any further abuse or hep C or HIV in the past  - neg HIV, neg hep A and B. No hep C done - but reports neg in the past    Tobacco use- (present on admission)  -Nicotine patch  -Encourage lifestyle  modification    Headache, migraine- (present on admission)  -resumed Fioricet in past   - better       Resolved Hospital Problems:  No notes have been filed under this hospital service.  Service: Hospitalist        Hospital Course:  Yesica Boudreaux is a 59 y.o. female  Brought from [facility], where she was admitted recently for heroin addiction rehab. She has PMH of heroin abuse,HTN, migraine, CAD. Presenting with fever , cough and SOB. CXR was c/w bilateral LL pna, though more on RLL. Given substance abuse, obtained HIV screen which was neg. Hepatitis profile was neg but no hep C was sent. Normal LFT. Pt claims it has been neg on multiple test. Pt was treated as sepsis due to pna by broad spectrum abx, by vanco and unasyn and zithromax. Clinically improved with leukocytosis, hypoxia and fever completely resolved. Ambulating without SANCHEZ. Neg urine strep and legionella ag. Sputum cx showed kleb pna. Neg blood cx. Switched to po Cefdinir and zithromax for 7 days more given severity.  Pt will be transferred back to [facility] for continuous rehab. Stable for discharge      Discharge Follow Up Recommendations for labs/diagnostics:         Day of Discharge     HPI:   59 y.o.  female with a history of HTN, heroin abuse, migraines and CAD.  She presented to [facility] room 12/4/19 with fever, cough and SOA. Pt states that she recently was hospitalized at [facility] with a pneumothorax secondary to CPR being performed. Pt states she had overdosed on heroin on Thanksgiving and family members performed CPR until EMS arrived. Pt states she is now at [facility] rehab. Pt states she started to not feel well on 12/3/19, with fever of 103.0, cough, chest pain, congestion and SOA. She Pt states the pain in her chest is worse with cough, 5 out of 10 and eases at rest. Pt states she has noticed some wheezing and increase in SOA. Pt denies Nausea, vomiting, night sweats, hemoptysis or swelling in legs.       In the ED pt was found to be hypoxic with O2 sats less than 90% and placed on oxygen at 2L per NC. Febrile  Temp of 103.0, WBC 16, lactate 0.8, blood cultures pending. CXR: Small rt apical pneumothorax without mediatinal shift, extensive density in the RLL post obstructive pneumonia or atelectasis. Smaller patchy density in the LLL is favored to represent Pneumonia. Pt received DuoNeb x 1, Solu medrol 125mg IV, Vancomycin and Unasyn for pneumonia.   Pt will be admitted for further evaluation and workup.     Pt triggered sepsis with Temp 103.0, hypoxia, WBC 16.0 and Pneumonia.     Vital Signs:   Temp:  [98.3 °F (36.8 °C)] 98.3 °F (36.8 °C)  Heart Rate:  [57-74] 58  Resp:  [16] 16  BP: (154-159)/(76-78) 154/76     Physical Exam:  Constitutional: She is oriented to person, place, and time. She appears well-developed and well-nourished. No distress.   HENT:   Head: Normocephalic and atraumatic.   Eyes: Conjunctivae and EOM are normal.   Neck: Normal range of motion. Neck supple.   Cardiovascular: Normal rate, regular rhythm, normal heart sounds and intact distal pulses.   Pulmonary/Chest: Effort normal. No respiratory distress. no  wheezes. Basal rales and diminished bs on both lungs  Abdominal: Soft. Bowel sounds are normal.   Musculoskeletal: Normal range of motion.   Neurological: She is alert and oriented to person, place, and time.   Skin: Skin is warm and dry.     Pertinent  and/or Most Recent Results     Results from last 7 days   Lab Units 12/09/19  0329 12/08/19  0830 12/08/19  0503 12/07/19  1212 12/07/19  0635 12/06/19  0249 12/06/19  0248 12/05/19  0451 12/04/19 2017   WBC 10*3/mm3  --  9.70  --  11.30*  --  22.70*  --  14.60* 16.00*   HEMOGLOBIN g/dL  --  10.9*  --  10.6*  --  10.6*  --  11.6* 11.9*   HEMATOCRIT %  --  31.7*  --  32.4*  --  31.7*  --  35.5 36.0   PLATELETS 10*3/mm3  --  323  --  340  --  310  --  295 298   SODIUM mmol/L  --   --   --   --   --   --   --  139 134*   POTASSIUM mmol/L   --   --   --   --   --   --   --  5.1 4.5   CHLORIDE mmol/L  --   --   --   --   --   --   --  100 93*   CO2 mmol/L  --   --   --   --   --   --   --  31.0* 31.0*   BUN mg/dL  --   --   --   --   --   --   --  16 15   CREATININE mg/dL 0.68  --  0.76  --  0.80  --  0.71 0.74 0.80   GLUCOSE mg/dL  --   --   --   --   --   --   --  201* 111*   CALCIUM mg/dL  --   --   --   --   --   --   --  9.1 9.0     Results from last 7 days   Lab Units 12/04/19 2017   BILIRUBIN mg/dL 0.2   ALK PHOS U/L 92   ALT (SGPT) U/L 18   AST (SGOT) U/L 19           Invalid input(s): TG, LDLCALC, LDLREALC  Results from last 7 days   Lab Units 12/05/19  0940 12/04/19 2022 12/04/19 2017   HEMOGLOBIN A1C % 5.5  --   --    TROPONIN T ng/mL  --   --  <0.010   LACTATE mmol/L  --  0.8  --        Brief Urine Lab Results     None          Microbiology Results Abnormal     Procedure Component Value - Date/Time    Blood Culture - Blood, Arm, Right [[id number]] Collected:  12/04/19 2133    Lab Status:  Preliminary result Specimen:  Blood from Arm, Right Updated:  12/08/19 2145     Blood Culture No growth at 4 days    Blood Culture - Blood, Arm, Left [[id number]] Collected:  12/04/19 2017    Lab Status:  Preliminary result Specimen:  Blood from Arm, Left Updated:  12/08/19 2030     Blood Culture No growth at 4 days    Respiratory Culture - Sputum, Cough [[id number]]  (Abnormal)  (Susceptibility) Collected:  12/06/19 1045    Lab Status:  Final result Specimen:  Sputum from Cough Updated:  12/08/19 1052     Respiratory Culture Scant growth (1+) Klebsiella pneumoniae ssp pneumoniae      Scant growth (1+) Normal Respiratory Edita     Gram Stain Moderate (3+) WBCs per low power field      Occasional Gram positive cocci    Susceptibility      Klebsiella pneumoniae ssp pneumoniae     NICKY     Ampicillin Resistant     Ampicillin + Sulbactam Susceptible     Cefepime Susceptible     Ceftazidime Susceptible     Ceftriaxone Susceptible     Gentamicin Susceptible      Levofloxacin Susceptible     Piperacillin + Tazobactam Susceptible     Tetracycline Susceptible     Trimethoprim + Sulfamethoxazole Susceptible                Susceptibility Comments     Klebsiella pneumoniae ssp pneumoniae    Cefazolin sensitivity will not be reported for Enterobacteriaceae in non-urine isolates. If cefazolin is preferred, please call the microbiology lab to request an E-test.             Legionella Antigen, Urine - Urine, Urine, Clean Catch [[id number]]  (Normal) Collected:  12/05/19 1645    Lab Status:  Final result Specimen:  Urine, Clean Catch Updated:  12/05/19 1833     LEGIONELLA ANTIGEN, URINE Negative    S. Pneumo Ag Urine or CSF - Urine, Urine, Clean Catch [[id number]]  (Normal) Collected:  12/05/19 1645    Lab Status:  Final result Specimen:  Urine, Clean Catch Updated:  12/05/19 1833     Strep Pneumo Ag Negative          Xr Chest 1 View    Result Date: 12/4/2019  Impression:  1. Small right apical pneumothorax without mediastinal shift. 2. Prominent nodular right infrahilar region, mass/adenopathy is not excluded. There is extensive density in the right lower lobe with subtle signs of volume loss. Postobstructive pneumonia and atelectasis is not excluded. Smaller patchy density in the left lower lung is favored to represent pneumonia. 3. No prior chest imaging studies are available at this institution for comparison. Consider chest CT with contrast for further evaluation.  Electronically Signed By-Zen Hopson DO. On:12/4/2019 10:17 PM This report was finalized on [id number] by  Zen Hopson DO..                      Order Current Status    Blood Culture - Blood, Arm, Left Preliminary result    Blood Culture - Blood, Arm, Right Preliminary result        Discharge Details        Discharge Medications      New Medications      Instructions Start Date   azithromycin 500 MG tablet  Commonly known as:  ZITHROMAX   500 mg, Oral, Daily      cefdinir 300 MG capsule  Commonly known as:   OMNICEF   300 mg, Oral, 2 Times Daily      saccharomyces boulardii 250 MG capsule  Commonly known as:  FLORASTOR   250 mg, Oral, 2 Times Daily      sertraline 25 MG tablet  Commonly known as:  ZOLOFT   25 mg, Oral, Daily   Start Date:  December 10, 2019        Changes to Medications      Instructions Start Date   butalbital-acetaminophen-caffeine -40 MG per tablet  Commonly known as:  FIORICET, ESGIC  What changed:  how much to take   2 tablets, Oral, Every 6 Hours PRN         Continue These Medications      Instructions Start Date   albuterol sulfate  (90 Base) MCG/ACT inhaler  Commonly known as:  PROVENTIL HFA;VENTOLIN HFA;PROAIR HFA   2 puffs, Inhalation, Every 4 Hours PRN      buprenorphine-naloxone 8-2 MG per SL tablet  Commonly known as:  SUBOXONE   1 tablet, Sublingual, 2 Times Daily             No Known Allergies      Discharge Disposition:  Rehab Facility or Unit (DC - External)    Diet:  Hospital:  Diet Order   Procedures   • Diet Regular         Discharge Activity: as tolerated        CODE STATUS:    Code Status and Medical Interventions:   Ordered at: 12/04/19 1837     Level Of Support Discussed With:    Patient     Code Status:    CPR     Medical Interventions (Level of Support Prior to Arrest):    Full         No future appointments.        Time spent on Discharge including face to face service:  40 minutes    Electronically signed by Shelly Lee MD, 12/09/19, 10:19 AM.

## 2019-12-09 NOTE — PLAN OF CARE
Problem: Patient Care Overview  Goal: Plan of Care Review  Outcome: Ongoing (interventions implemented as appropriate)  Flowsheets (Taken 12/8/2019 1906)  Plan of Care Reviewed With: patient  Goal: Individualization and Mutuality  Outcome: Ongoing (interventions implemented as appropriate)  Goal: Discharge Needs Assessment  Outcome: Ongoing (interventions implemented as appropriate)  Goal: Interprofessional Rounds/Family Conf  Outcome: Ongoing (interventions implemented as appropriate)     Problem: Pain, Chronic (Adult)  Goal: Identify Related Risk Factors and Signs and Symptoms  Outcome: Ongoing (interventions implemented as appropriate)  Flowsheets  Taken 12/6/2019 0452 by Lis Nails, RN  Related Risk Factors (Chronic Pain): disease process;pain control inadequate  Taken 12/9/2019 0408 by Caio Daniel RN  Signs and Symptoms (Chronic Pain): other (see comments)  Goal: Acceptable Pain/Comfort Level and Functional Ability  Outcome: Ongoing (interventions implemented as appropriate)  Goal: Identify Related Risk Factors and Signs and Symptoms  Outcome: Ongoing (interventions implemented as appropriate)  Flowsheets (Taken 12/9/2019 0408)  Signs and Symptoms (Chronic Pain): other (see comments)  Goal: Acceptable Pain/Comfort Level and Functional Ability  Outcome: Ongoing (interventions implemented as appropriate)     Problem: Infection, Risk/Actual (Adult)  Goal: Identify Related Risk Factors and Signs and Symptoms  Outcome: Ongoing (interventions implemented as appropriate)  Goal: Infection Prevention/Resolution  Outcome: Ongoing (interventions implemented as appropriate)  Flowsheets (Taken 12/9/2019 0408)  Infection Prevention/Resolution: making progress toward outcome

## 2019-12-10 NOTE — PAYOR COMM NOTE
"This is discharge notice for Tsering Boudreaux, auth#  [id number]   Pt discharged back to detox rehab facility on 19 .    KALEN MONTANA RN  UTILIZATION REVIEW  [facility]  PH: [phone]  FX: [phone]      Tsering Boudreaux (59 y.o. Female)     Date of Birth Social Security Number Address Home Phone MRN    1960  [address] [id number] [phone] [id number]    Congregational Marital Status          Rastafari        Admission Date Admission Type Admitting Provider Attending Provider Department, Room/Bed    19 Emergency Shelly Torres MD  [facility] 3A MEDICAL INPATIENT, 341/1    Discharge Date Discharge Disposition Discharge Destination        2019 Rehab Facility or Unit (DC - External)              Attending Provider:  (none)   Allergies:  No Known Allergies    Isolation:  None   Infection:  None   Code Status:  Prior    Ht:  162.6 cm (64\")   Wt:  63.5 kg (140 lb)    Admission Cmt:  None   Principal Problem:  Acute respiratory failure with hypoxia (CMS/HCC) [J96.01] More...                 Active Insurance as of 2019     Primary Coverage     Payor Plan Insurance Group Employer/Plan Group    [id number]     Payor Plan Address Payor Plan Phone Number Payor Plan Fax Number Effective Dates    MAIL STOP:   2019 - None Entered    [address] [place] [address]       Subscriber Name Subscriber Birth Date Member ID       TSERING BOUDREAUX 1960 [id number]                 Emergency Contacts      (Rel.) Home Phone Work Phone Mobile Phone    LAMONTE LYLES (Daughter) [phone] -- --               Discharge Summary      Shelly Torres MD at 19 1019            [facility]   DISCHARGE SUMMARY    Patient Name: Tsering Boudreaux  : 1960  MRN: [id number]    Date of Admission: 2019  Date of Discharge:  2019    Primary Care Physician: Oralia Senior    Consults     No orders found " from 11/5/2019 to 12/5/2019.          Hospital Course     Presenting Problem:   Acute exacerbation of chronic obstructive pulmonary disease (COPD) (CMS/[facility]) [J44.1]  History of pneumothorax [Z87.09]  History of heroin abuse (CMS/[facility]) [F11.11]  Pneumonia of right middle lobe due to infectious organism (CMS/[facility]) [J18.1]    Active Hospital Problems:  * Acute respiratory failure with hypoxia (CMS/[facility])- (present on admission)  Due to pneumonia and COPD exa  Resolved , on RA    COPD exacerbation (CMS/[facility])- (present on admission)  Duoneb, flutter valve, abx, iv steroid - will taper steroid off  Continue O2  Counseled about tobacco cessation     Severe sepsis (CMS/[facility])- (present on admission)  - triggered sepsis due to RLL pneumonia, elevated temp 103.1, hypoxia requiring 4L per NC, WBC 16, lactate 0.8  -Blood cultures - NG  -off fluid  - resolved    Community acquired pneumonia, bilateral- (present on admission)  -CXR: RLL >LLL, bilateral PNA  -Continue Unasyn and Vancomycin - will stop vanco, sputum cx- kleb P. Scan amount. Add zithromax  -Oxygen as need to keep sats 90-95% - improved in O2 demand  -Duonebs PRN for Wheezing, SOA  - sputum cx- Kleb P, blood cx -NG  - urine legionella and strep ag - neg  - fever resolved  - leukocytosis resolved  - clinically improved, can ambulate without SOB  - will change to po abx tomorrow for discharge         Heroin addiction (CMS/[facility])- (present on admission)   -Pt with recent overdose, currently at [facility] - will go back there  -On Suboxone  - denies any further abuse or hep C or HIV in the past  - neg HIV, neg hep A and B. No hep C done - but reports neg in the past    Tobacco use- (present on admission)  -Nicotine patch  -Encourage lifestyle modification    Headache, migraine- (present on admission)  -resumed Fioricet in past   - better       Resolved Hospital Problems:  No notes have been filed under this hospital service.  Service: [facility]  Course:  Yesica Boudreaux is a 59 y.o. female  Brought from [facility], where she was admitted recently for heroin addiction rehab. She has PMH of heroin abuse,HTN, migraine, CAD. Presenting with fever , cough and SOB. CXR was c/w bilateral LL pna, though more on RLL. Given substance abuse, obtained HIV screen which was neg. Hepatitis profile was neg but no hep C was sent. Normal LFT. Pt claims it has been neg on multiple test. Pt was treated as sepsis due to pna by broad spectrum abx, by vanco and unasyn and zithromax. Clinically improved with leukocytosis, hypoxia and fever completely resolved. Ambulating without SANCHEZ. Neg urine strep and legionella ag. Sputum cx showed kleb pna. Neg blood cx. Switched to po Cefdinir and zithromax for 7 days more given severity.  Pt will be transferred back to [facility] for continuous rehab. Stable for discharge      Discharge Follow Up Recommendations for labs/diagnostics:         Day of Discharge     HPI:   59 y.o.  female with a history of HTN, heroin abuse, migraines and CAD.  She presented to [facility] Emergency room 12/4/19 with fever, cough and SOA. Pt states that she recently was hospitalized at [facility] with a pneumothorax secondary to CPR being performed. Pt states she had overdosed on heroin on Thanksgiving and family members performed CPR until EMS arrived. Pt states she is now at [facility] rehab. Pt states she started to not feel well on 12/3/19, with fever of 103.0, cough, chest pain, congestion and SOA. She Pt states the pain in her chest is worse with cough, 5 out of 10 and eases at rest. Pt states she has noticed some wheezing and increase in SOA. Pt denies Nausea, vomiting, night sweats, hemoptysis or swelling in legs.      In the ED pt was found to be hypoxic with O2 sats less than 90% and placed on oxygen at 2L per NC. Febrile  Temp of 103.0, WBC 16, lactate 0.8, blood cultures pending. CXR: Small rt apical pneumothorax without  mediatinal shift, extensive density in the RLL post obstructive pneumonia or atelectasis. Smaller patchy density in the LLL is favored to represent Pneumonia. Pt received DuoNeb x 1, Solu medrol 125mg IV, Vancomycin and Unasyn for pneumonia.   Pt will be admitted for further evaluation and workup.     Pt triggered sepsis with Temp 103.0, hypoxia, WBC 16.0 and Pneumonia.     Vital Signs:   Temp:  [98.3 °F (36.8 °C)] 98.3 °F (36.8 °C)  Heart Rate:  [57-74] 58  Resp:  [16] 16  BP: (154-159)/(76-78) 154/76     Physical Exam:  Constitutional: She is oriented to person, place, and time. She appears well-developed and well-nourished. No distress.   HENT:   Head: Normocephalic and atraumatic.   Eyes: Conjunctivae and EOM are normal.   Neck: Normal range of motion. Neck supple.   Cardiovascular: Normal rate, regular rhythm, normal heart sounds and intact distal pulses.   Pulmonary/Chest: Effort normal. No respiratory distress. no  wheezes. Basal rales and diminished bs on both lungs  Abdominal: Soft. Bowel sounds are normal.   Musculoskeletal: Normal range of motion.   Neurological: She is alert and oriented to person, place, and time.   Skin: Skin is warm and dry.     Pertinent  and/or Most Recent Results     Results from last 7 days   Lab Units 12/09/19  0329 12/08/19  0830 12/08/19  0503 12/07/19  1212 12/07/19  0635 12/06/19  0249 12/06/19  0248 12/05/19  0451 12/04/19 2017   WBC 10*3/mm3  --  9.70  --  11.30*  --  22.70*  --  14.60* 16.00*   HEMOGLOBIN g/dL  --  10.9*  --  10.6*  --  10.6*  --  11.6* 11.9*   HEMATOCRIT %  --  31.7*  --  32.4*  --  31.7*  --  35.5 36.0   PLATELETS 10*3/mm3  --  323  --  340  --  310  --  295 298   SODIUM mmol/L  --   --   --   --   --   --   --  139 134*   POTASSIUM mmol/L  --   --   --   --   --   --   --  5.1 4.5   CHLORIDE mmol/L  --   --   --   --   --   --   --  100 93*   CO2 mmol/L  --   --   --   --   --   --   --  31.0* 31.0*   BUN mg/dL  --   --   --   --   --   --   --  16 15    CREATININE mg/dL 0.68  --  0.76  --  0.80  --  0.71 0.74 0.80   GLUCOSE mg/dL  --   --   --   --   --   --   --  201* 111*   CALCIUM mg/dL  --   --   --   --   --   --   --  9.1 9.0     Results from last 7 days   Lab Units 12/04/19 2017   BILIRUBIN mg/dL 0.2   ALK PHOS U/L 92   ALT (SGPT) U/L 18   AST (SGOT) U/L 19           Invalid input(s): TG, LDLCALC, LDLREALC  Results from last 7 days   Lab Units 12/05/19  0940 12/04/19 2022 12/04/19 2017   HEMOGLOBIN A1C % 5.5  --   --    TROPONIN T ng/mL  --   --  <0.010   LACTATE mmol/L  --  0.8  --        Brief Urine Lab Results     None          Microbiology Results Abnormal     Procedure Component Value - Date/Time    Blood Culture - Blood, Arm, Right [[id number]] Collected:  12/04/19 2133    Lab Status:  Preliminary result Specimen:  Blood from Arm, Right Updated:  12/08/19 2145     Blood Culture No growth at 4 days    Blood Culture - Blood, Arm, Left [[id number]] Collected:  12/04/19 2017    Lab Status:  Preliminary result Specimen:  Blood from Arm, Left Updated:  12/08/19 2030     Blood Culture No growth at 4 days    Respiratory Culture - Sputum, Cough [[id number]]  (Abnormal)  (Susceptibility) Collected:  12/06/19 1045    Lab Status:  Final result Specimen:  Sputum from Cough Updated:  12/08/19 1052     Respiratory Culture Scant growth (1+) Klebsiella pneumoniae ssp pneumoniae      Scant growth (1+) Normal Respiratory Edita     Gram Stain Moderate (3+) WBCs per low power field      Occasional Gram positive cocci    Susceptibility      Klebsiella pneumoniae ssp pneumoniae     NICKY     Ampicillin Resistant     Ampicillin + Sulbactam Susceptible     Cefepime Susceptible     Ceftazidime Susceptible     Ceftriaxone Susceptible     Gentamicin Susceptible     Levofloxacin Susceptible     Piperacillin + Tazobactam Susceptible     Tetracycline Susceptible     Trimethoprim + Sulfamethoxazole Susceptible                Susceptibility Comments     Klebsiella pneumoniae ssp  pneumoniae    Cefazolin sensitivity will not be reported for Enterobacteriaceae in non-urine isolates. If cefazolin is preferred, please call the microbiology lab to request an E-test.             Legionella Antigen, Urine - Urine, Urine, Clean Catch [[id number]]  (Normal) Collected:  12/05/19 1645    Lab Status:  Final result Specimen:  Urine, Clean Catch Updated:  12/05/19 1833     LEGIONELLA ANTIGEN, URINE Negative    S. Pneumo Ag Urine or CSF - Urine, Urine, Clean Catch [[id number]]  (Normal) Collected:  12/05/19 1645    Lab Status:  Final result Specimen:  Urine, Clean Catch Updated:  12/05/19 1833     Strep Pneumo Ag Negative          Xr Chest 1 View    Result Date: 12/4/2019  Impression:  1. Small right apical pneumothorax without mediastinal shift. 2. Prominent nodular right infrahilar region, mass/adenopathy is not excluded. There is extensive density in the right lower lobe with subtle signs of volume loss. Postobstructive pneumonia and atelectasis is not excluded. Smaller patchy density in the left lower lung is favored to represent pneumonia. 3. No prior chest imaging studies are available at this institution for comparison. Consider chest CT with contrast for further evaluation.  Electronically Signed By-Zen Hopson DO. On:12/4/2019 10:17 PM This report was finalized on [id number] by  Zen Hopson DO..                      Order Current Status    Blood Culture - Blood, Arm, Left Preliminary result    Blood Culture - Blood, Arm, Right Preliminary result        Discharge Details        Discharge Medications      New Medications      Instructions Start Date   azithromycin 500 MG tablet  Commonly known as:  ZITHROMAX   500 mg, Oral, Daily      cefdinir 300 MG capsule  Commonly known as:  OMNICEF   300 mg, Oral, 2 Times Daily      saccharomyces boulardii 250 MG capsule  Commonly known as:  FLORASTOR   250 mg, Oral, 2 Times Daily      sertraline 25 MG tablet  Commonly known as:  ZOLOFT   25 mg, Oral,  Daily   Start Date:  December 10, 2019        Changes to Medications      Instructions Start Date   butalbital-acetaminophen-caffeine -40 MG per tablet  Commonly known as:  FIORICET, ESGIC  What changed:  how much to take   2 tablets, Oral, Every 6 Hours PRN         Continue These Medications      Instructions Start Date   albuterol sulfate  (90 Base) MCG/ACT inhaler  Commonly known as:  PROVENTIL HFA;VENTOLIN HFA;PROAIR HFA   2 puffs, Inhalation, Every 4 Hours PRN      buprenorphine-naloxone 8-2 MG per SL tablet  Commonly known as:  SUBOXONE   1 tablet, Sublingual, 2 Times Daily             No Known Allergies      Discharge Disposition:  Rehab Facility or Unit (DC - External)    Diet:  Hospital:  Diet Order   Procedures   • Diet Regular         Discharge Activity: as tolerated        CODE STATUS:    Code Status and Medical Interventions:   Ordered at: 12/04/19 8327     Level Of Support Discussed With:    Patient     Code Status:    CPR     Medical Interventions (Level of Support Prior to Arrest):    Full         No future appointments.        Time spent on Discharge including face to face service:  40 minutes    Electronically signed by Shelly Lee MD, 12/09/19, 10:19 AM.    Electronically signed by Shelly Torres MD at 12/09/19 3108

## 2019-12-10 NOTE — PROGRESS NOTES
Case Management Discharge Note                             Final Discharge Disposition Code: 62 - inpatient rehab facility

## 2023-01-18 ENCOUNTER — INPATIENT HOSPITAL (AMBULATORY)
Dept: URBAN - METROPOLITAN AREA HOSPITAL 76 | Facility: HOSPITAL | Age: 63
End: 2023-01-18
Payer: COMMERCIAL

## 2023-01-18 DIAGNOSIS — K62.5 HEMORRHAGE OF ANUS AND RECTUM: ICD-10-CM

## 2023-01-18 DIAGNOSIS — K21.00 GASTRO-ESOPHAGEAL REFLUX DISEASE WITH ESOPHAGITIS, WITHOUT B: ICD-10-CM

## 2023-01-18 DIAGNOSIS — R74.01 ELEVATION OF LEVELS OF LIVER TRANSAMINASE LEVELS: ICD-10-CM

## 2023-01-18 DIAGNOSIS — F19.10 OTHER PSYCHOACTIVE SUBSTANCE ABUSE, UNCOMPLICATED: ICD-10-CM

## 2023-01-18 DIAGNOSIS — R19.7 DIARRHEA, UNSPECIFIED: ICD-10-CM

## 2023-01-18 DIAGNOSIS — R41.82 ALTERED MENTAL STATUS, UNSPECIFIED: ICD-10-CM

## 2023-01-18 PROCEDURE — 99222 1ST HOSP IP/OBS MODERATE 55: CPT | Performed by: INTERNAL MEDICINE

## 2023-01-20 ENCOUNTER — INPATIENT HOSPITAL (AMBULATORY)
Dept: URBAN - METROPOLITAN AREA HOSPITAL 76 | Facility: HOSPITAL | Age: 63
End: 2023-01-20
Payer: COMMERCIAL

## 2023-01-20 DIAGNOSIS — R41.82 ALTERED MENTAL STATUS, UNSPECIFIED: ICD-10-CM

## 2023-01-20 DIAGNOSIS — K52.9 NONINFECTIVE GASTROENTERITIS AND COLITIS, UNSPECIFIED: ICD-10-CM

## 2023-01-20 DIAGNOSIS — F19.10 OTHER PSYCHOACTIVE SUBSTANCE ABUSE, UNCOMPLICATED: ICD-10-CM

## 2023-01-20 DIAGNOSIS — D72.829 ELEVATED WHITE BLOOD CELL COUNT, UNSPECIFIED: ICD-10-CM

## 2023-01-20 DIAGNOSIS — K62.5 HEMORRHAGE OF ANUS AND RECTUM: ICD-10-CM

## 2023-01-20 DIAGNOSIS — R74.8 ABNORMAL LEVELS OF OTHER SERUM ENZYMES: ICD-10-CM

## 2023-01-20 DIAGNOSIS — R93.3 ABNORMAL FINDINGS ON DIAGNOSTIC IMAGING OF OTHER PARTS OF DI: ICD-10-CM

## 2023-01-20 PROCEDURE — 99232 SBSQ HOSP IP/OBS MODERATE 35: CPT

## 2023-04-12 ENCOUNTER — OFFICE (AMBULATORY)
Dept: URBAN - METROPOLITAN AREA PATHOLOGY 4 | Facility: PATHOLOGY | Age: 63
End: 2023-04-12
Payer: COMMERCIAL

## 2023-04-12 ENCOUNTER — ON CAMPUS - OUTPATIENT (AMBULATORY)
Dept: URBAN - METROPOLITAN AREA HOSPITAL 2 | Facility: HOSPITAL | Age: 63
End: 2023-04-12
Payer: COMMERCIAL

## 2023-04-12 VITALS
OXYGEN SATURATION: 98 % | HEART RATE: 59 BPM | DIASTOLIC BLOOD PRESSURE: 98 MMHG | OXYGEN SATURATION: 100 % | HEART RATE: 63 BPM | DIASTOLIC BLOOD PRESSURE: 61 MMHG | SYSTOLIC BLOOD PRESSURE: 135 MMHG | DIASTOLIC BLOOD PRESSURE: 77 MMHG | DIASTOLIC BLOOD PRESSURE: 67 MMHG | SYSTOLIC BLOOD PRESSURE: 111 MMHG | RESPIRATION RATE: 18 BRPM | SYSTOLIC BLOOD PRESSURE: 140 MMHG | HEART RATE: 55 BPM | SYSTOLIC BLOOD PRESSURE: 92 MMHG | HEART RATE: 65 BPM | SYSTOLIC BLOOD PRESSURE: 108 MMHG | HEART RATE: 57 BPM | RESPIRATION RATE: 17 BRPM | HEART RATE: 60 BPM | HEART RATE: 56 BPM | OXYGEN SATURATION: 97 % | DIASTOLIC BLOOD PRESSURE: 69 MMHG | RESPIRATION RATE: 16 BRPM | HEIGHT: 64 IN | SYSTOLIC BLOOD PRESSURE: 136 MMHG | WEIGHT: 158 LBS | HEART RATE: 54 BPM | DIASTOLIC BLOOD PRESSURE: 62 MMHG | SYSTOLIC BLOOD PRESSURE: 115 MMHG | DIASTOLIC BLOOD PRESSURE: 60 MMHG | SYSTOLIC BLOOD PRESSURE: 116 MMHG | TEMPERATURE: 97.5 F | DIASTOLIC BLOOD PRESSURE: 64 MMHG | SYSTOLIC BLOOD PRESSURE: 98 MMHG | SYSTOLIC BLOOD PRESSURE: 95 MMHG | SYSTOLIC BLOOD PRESSURE: 104 MMHG | HEART RATE: 61 BPM | DIASTOLIC BLOOD PRESSURE: 72 MMHG | SYSTOLIC BLOOD PRESSURE: 105 MMHG | DIASTOLIC BLOOD PRESSURE: 75 MMHG

## 2023-04-12 DIAGNOSIS — K52.9 NONINFECTIVE GASTROENTERITIS AND COLITIS, UNSPECIFIED: ICD-10-CM

## 2023-04-12 DIAGNOSIS — K57.30 DIVERTICULOSIS OF LARGE INTESTINE WITHOUT PERFORATION OR ABS: ICD-10-CM

## 2023-04-12 DIAGNOSIS — K64.1 SECOND DEGREE HEMORRHOIDS: ICD-10-CM

## 2023-04-12 DIAGNOSIS — D12.5 BENIGN NEOPLASM OF SIGMOID COLON: ICD-10-CM

## 2023-04-12 DIAGNOSIS — D12.0 BENIGN NEOPLASM OF CECUM: ICD-10-CM

## 2023-04-12 DIAGNOSIS — K63.3 ULCER OF INTESTINE: ICD-10-CM

## 2023-04-12 DIAGNOSIS — Z12.11 ENCOUNTER FOR SCREENING FOR MALIGNANT NEOPLASM OF COLON: ICD-10-CM

## 2023-04-12 DIAGNOSIS — K63.89 OTHER SPECIFIED DISEASES OF INTESTINE: ICD-10-CM

## 2023-04-12 PROBLEM — K63.5 POLYP OF COLON: Status: ACTIVE | Noted: 2023-04-12

## 2023-04-12 LAB
GI HISTOLOGY: A. SELECT: (no result)
GI HISTOLOGY: B. UNSPECIFIED: (no result)
GI HISTOLOGY: C. UNSPECIFIED: (no result)
GI HISTOLOGY: PDF REPORT: (no result)

## 2023-04-12 PROCEDURE — 45380 COLONOSCOPY AND BIOPSY: CPT | Mod: 59,33 | Performed by: INTERNAL MEDICINE

## 2023-04-12 PROCEDURE — 88305 TISSUE EXAM BY PATHOLOGIST: CPT | Performed by: INTERNAL MEDICINE

## 2023-04-12 PROCEDURE — 45385 COLONOSCOPY W/LESION REMOVAL: CPT | Mod: 33 | Performed by: INTERNAL MEDICINE

## 2023-04-12 PROCEDURE — 45380 COLONOSCOPY AND BIOPSY: CPT | Mod: 33,59 | Performed by: INTERNAL MEDICINE

## 2023-04-12 RX ORDER — CIPROFLOXACIN 500 MG/1
1000 TABLET, FILM COATED ORAL
Qty: 20 | Refills: 0 | Status: ACTIVE
Start: 2023-04-12

## 2023-04-12 RX ORDER — POLYETHYLENE GLYCOL 3350 17 G/17G
POWDER, FOR SOLUTION ORAL
Qty: 3 | Refills: 3 | Status: ACTIVE
Start: 2023-04-12

## 2023-04-12 NOTE — SERVICENOTES
Suspect likely severe focal ischemic colitis with stricture, have to use EGD scope to navigate stricture. TI/Right colon mucosa normal

## 2023-04-12 NOTE — SERVICEHPINOTES
LUCIANA LEES  is a  62  female   who presents today for a  Colonoscopy   for   the indications listed below. The updated Patient Profile was reviewed prior to the procedure, in conjunction with the Physical Exam, including medical conditions, surgical procedures, medications, allergies, family history and social history. See Physical Exam time stamp below for date and time of HPI completion.Pre-operatively, I reviewed the indication(s) for the procedure, the risks of the procedure [including but not limited to: unexpected bleeding possibly requiring hospitalization and/or unplanned repeat procedures, perforation possibly requiring surgical treatment, missed lesions and complications of sedation/MAC (also explained by anesthesia staff)]. I have evaluated the patient for risks associated with the planned anesthesia and the procedure to be performed and find the patient an acceptable candidate for IV sedation.Multiple opportunities were provided for any questions or concerns, and all questions were answered satisfactorily before any anesthesia was administered. We will proceed with the planned procedure.br